# Patient Record
Sex: FEMALE | Race: BLACK OR AFRICAN AMERICAN | NOT HISPANIC OR LATINO | Employment: FULL TIME | URBAN - METROPOLITAN AREA
[De-identification: names, ages, dates, MRNs, and addresses within clinical notes are randomized per-mention and may not be internally consistent; named-entity substitution may affect disease eponyms.]

---

## 2021-02-10 ENCOUNTER — HISTORICAL (OUTPATIENT)
Dept: RADIOLOGY | Facility: HOSPITAL | Age: 31
End: 2021-02-10

## 2021-02-10 ENCOUNTER — HOSPITAL ENCOUNTER (OUTPATIENT)
Dept: MEDSURG UNIT | Facility: HOSPITAL | Age: 31
End: 2021-02-11

## 2021-02-10 LAB — SARS-COV-2 AG RESP QL IA.RAPID: NEGATIVE

## 2021-02-11 LAB — GRAM STN SPEC: NORMAL

## 2021-02-15 LAB — FINAL CULTURE: NO GROWTH

## 2021-03-15 LAB — FINAL CULTURE: NORMAL

## 2021-06-17 ENCOUNTER — HISTORICAL (OUTPATIENT)
Dept: ADMINISTRATIVE | Facility: HOSPITAL | Age: 31
End: 2021-06-17

## 2021-06-17 LAB
ABS NEUT (OLG): 3.75 X10(3)/MCL (ref 2.1–9.2)
ALBUMIN SERPL-MCNC: 3.9 GM/DL (ref 3.5–5)
ALBUMIN/GLOB SERPL: 1.1 RATIO (ref 1.1–2)
ALP SERPL-CCNC: 70 UNIT/L (ref 40–150)
ALT SERPL-CCNC: 11 UNIT/L (ref 0–55)
APPEARANCE, UA: NORMAL
AST SERPL-CCNC: 18 UNIT/L (ref 5–34)
BACTERIA #/AREA URNS AUTO: ABNORMAL /HPF
BASOPHILS # BLD AUTO: 0 X10(3)/MCL (ref 0–0.2)
BASOPHILS NFR BLD AUTO: 1 %
BILIRUB SERPL-MCNC: 0.3 MG/DL
BILIRUB UR QL STRIP: NEGATIVE
BILIRUBIN DIRECT+TOT PNL SERPL-MCNC: 0.1 MG/DL (ref 0–0.5)
BILIRUBIN DIRECT+TOT PNL SERPL-MCNC: 0.2 MG/DL (ref 0–0.8)
BUN SERPL-MCNC: 8.6 MG/DL (ref 7–18.7)
CALCIUM SERPL-MCNC: 9.1 MG/DL (ref 8.4–10.2)
CHLORIDE SERPL-SCNC: 107 MMOL/L (ref 98–107)
CHOLEST SERPL-MCNC: 162 MG/DL
CHOLEST/HDLC SERPL: 5 {RATIO} (ref 0–5)
CO2 SERPL-SCNC: 25 MMOL/L (ref 22–29)
COLOR UR: NORMAL
CREAT SERPL-MCNC: 0.67 MG/DL (ref 0.55–1.02)
EOSINOPHIL # BLD AUTO: 0.3 X10(3)/MCL (ref 0–0.9)
EOSINOPHIL NFR BLD AUTO: 4 %
ERYTHROCYTE [DISTWIDTH] IN BLOOD BY AUTOMATED COUNT: 15.3 % (ref 11.5–14.5)
EST. AVERAGE GLUCOSE BLD GHB EST-MCNC: 99.7 MG/DL
GLOBULIN SER-MCNC: 3.5 GM/DL (ref 2.4–3.5)
GLUCOSE (UA): NEGATIVE
GLUCOSE SERPL-MCNC: 80 MG/DL (ref 74–100)
HBA1C MFR BLD: 5.1 %
HCT VFR BLD AUTO: 40.3 % (ref 35–46)
HDLC SERPL-MCNC: 33 MG/DL (ref 35–60)
HGB BLD-MCNC: 12.1 GM/DL (ref 12–16)
HGB UR QL STRIP: NEGATIVE
HYALINE CASTS #/AREA URNS LPF: ABNORMAL /LPF
IMM GRANULOCYTES # BLD AUTO: 0.02 10*3/UL
IMM GRANULOCYTES NFR BLD AUTO: 0 %
KETONES UR QL STRIP: NEGATIVE
LDLC SERPL CALC-MCNC: 105 MG/DL (ref 50–140)
LEUKOCYTE ESTERASE UR QL STRIP: NEGATIVE
LYMPHOCYTES # BLD AUTO: 2.8 X10(3)/MCL (ref 0.6–4.6)
LYMPHOCYTES NFR BLD AUTO: 38 %
MCH RBC QN AUTO: 22.2 PG (ref 26–34)
MCHC RBC AUTO-ENTMCNC: 30 GM/DL (ref 31–37)
MCV RBC AUTO: 73.8 FL (ref 80–100)
MONOCYTES # BLD AUTO: 0.4 X10(3)/MCL (ref 0.1–1.3)
MONOCYTES NFR BLD AUTO: 6 %
NEUTROPHILS # BLD AUTO: 3.75 X10(3)/MCL (ref 2.1–9.2)
NEUTROPHILS NFR BLD AUTO: 51 %
NITRITE UR QL STRIP: NEGATIVE
NRBC BLD AUTO-RTO: 0 % (ref 0–0.2)
PH UR STRIP: 6 [PH] (ref 4.5–8)
PLATELET # BLD AUTO: 308 X10(3)/MCL (ref 130–400)
PMV BLD AUTO: 10.5 FL (ref 7.4–10.4)
POTASSIUM SERPL-SCNC: 3.7 MMOL/L (ref 3.5–5.1)
PROT SERPL-MCNC: 7.4 GM/DL (ref 6.4–8.3)
PROT UR QL STRIP: NEGATIVE
RBC # BLD AUTO: 5.46 X10(6)/MCL (ref 4–5.2)
RBC #/AREA URNS AUTO: ABNORMAL /HPF
SODIUM SERPL-SCNC: 139 MMOL/L (ref 136–145)
SP GR UR STRIP: 1.02 (ref 1–1.03)
SQUAMOUS #/AREA URNS LPF: >100 /LPF
TRIGL SERPL-MCNC: 119 MG/DL (ref 37–140)
TSH SERPL-ACNC: 1.4 UIU/ML (ref 0.35–4.94)
UROBILINOGEN UR STRIP-ACNC: NORMAL
VLDLC SERPL CALC-MCNC: 24 MG/DL
WBC # SPEC AUTO: 7.3 X10(3)/MCL (ref 4.5–11)
WBC #/AREA URNS AUTO: ABNORMAL /HPF

## 2021-06-19 LAB — FINAL CULTURE: NORMAL

## 2022-01-27 ENCOUNTER — HISTORICAL (OUTPATIENT)
Dept: SURGERY | Facility: HOSPITAL | Age: 32
End: 2022-01-27

## 2022-01-27 LAB — SARS-COV-2 AG RESP QL IA.RAPID: NEGATIVE

## 2022-04-09 ENCOUNTER — HISTORICAL (OUTPATIENT)
Dept: ADMINISTRATIVE | Facility: HOSPITAL | Age: 32
End: 2022-04-09

## 2022-04-26 VITALS
OXYGEN SATURATION: 98 % | DIASTOLIC BLOOD PRESSURE: 86 MMHG | WEIGHT: 277.31 LBS | HEIGHT: 66 IN | BODY MASS INDEX: 44.57 KG/M2 | SYSTOLIC BLOOD PRESSURE: 131 MMHG

## 2022-05-02 NOTE — HISTORICAL [facility]ER
This is a historical note converted from Eugenia. Formatting and pictures may have been removed.  Please reference Eugenia for original formatting and attached multimedia. Chief Complaint  Est care GYN c/o feeling tired  History of Present Illness  31 Years?Black or ?Female?presents to [facility] [place] to establish care?with PCP. ?PMH: None (per review of?available EMR?and confirmed with patient).  ?   Previous PCP: None  PmHx: None  SHx: D and C; abscess with Iand D b/l breast  FHx: DM2, HTN- Mother and Father; Colon Ca- Father ( age 63 from colon ca)  ?   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 Wine occasional  Tobacco Intake? 1PPX 10 yrs and now 1/2 PPD  Illicit drug use? None  Sexual history: heterosexual  Lives with? Lives with 8, 9, 10? daughters  Daily Exercise???Moderate  ?   Complaints today:  Fatigue: Pt states she is excessively tired. She denies anxiety or depression. She drinks four 20 ounces of coke a cola a day. Pt states she eats a lot of candies.?  Obesity: Discussed healthy eating habits and exercise 150 minutes/ day. Discussed with patient the benefits to healthy BMI 25 or less. Discussed the adverse effects of obesity on health causing risk for other co-morbid conditions such has HTN, CAD, CVD, HLD, and DM2.  Vertigo with bending head down  Right knee pain: with limited flexion; had torn ligaments at age 15; worse with squatting 10/10 and ambulating pain is 7/10; pain characterized as aching.  ?   Preventative:  Last? cervical pap was 7 yrs ago;? LMP: 2021  ?  Review of Systems  GENERAL:?Negative for abnormal weight loss or weight gain, fatigue, fever, or chills. Negative except for stated in HPI.  HEENT:??Negative for head trauma,?blurred vision, rhinorrhea, ?nasal congestion, sore throat or hearing deficit. Negative except for stated in  HPI.  CARDIOVASCULAR: Negative for? chest pain, palpitations, CUBA, PND, orthopnea, peripheral edema or syncope. Negative except for stated in HPI.  RESPIRATORY: Negative for SOB, CUBA, cough or?wheezing.? Negative except for stated in HPI.  GASTROINTESTINAL: Negative for abdominal pain, nausea,??vomiting, diarrhea, constipation, heartburn, ?hematochezia? or melena.? Negative except for stated in HPI.  GENITOURINARY: Negative for dysuria, hematuria, urinary frequency, urinary urgency, urinary hesitancy or flank pain. Negative except for stated in HPI.  ENDOCRINE: Negative for fatigue, heat intolerance, cold intolerance, polyuria, polydipsia, or polyphagia.? Negative except for stated in HPI.  PSYCHOLOGICAL: Negative for depression, anxiety, suicidal or homicidal ideations, hallucinations??or?conner.? Negative except for?stated in HPI.  MUSCULOSKELETAL:?? Negative except for?stated in HPI.  INTEGUMENT: Negative for rash?or lesions. Negative except for stated in HPI.  NEUROLOGY: Negative for headaches, dizziness, numbness, tingling,?vertigo?or gait disturbances. Negative except for stated in HPI.  ?  ?  Physical Exam  Vitals & Measurements  T:?36.7? ?C (Oral)? HR:?70(Peripheral)? RR:?20? BP:?131/86? SpO2:?98%?  HT:?168.00?cm? WT:?125.800?kg? BMI:?44.57? LMP:?06/10/2021 00:00 CDT?  GENERAL:?Alert and oriented to person, place, time and situation,?NAD  HEENT: NCAT, Pupils equally round and reactive to light accommodation,?normal sclera, ?moist mucous membranes,?oropharynx normal,?normal nasal turbinates/ nares,??TM clear bilaterally, neck?supple,?thyroid normal,?no lymphadenopathy.  CARDIOVASCULAR:?Regular rateand?rhythm,? S1 and S2 normal;?no murmurs, no rubs, or no gallops; no carotid bruit.  RESPIRATORY:??Lungs clear to auscultation bilaterally;?no wheezes,?no rhonchi,?or? no crackles  ABDOMEN: Soft/ Nontender/ Nondistended; Bowel Sounds x4 - normoactive; no abdominal pulsatile mass, no masses, no hernias  EXTREMITY:?  No clubbing, no cyanosis and?no peripheral edema; Dorsalis pedis and tibial ?pulses 2+  MUSCULOSKELETAL: FROM of Upper and LLE; Strength 5/5 of Upper and Lower extremities; LROM right knee on flexion with TTP lateral jt space and crepitus  PSYCHOLOGICAL: Good judgement and insight; normal affect and mood.  NEUROLOGICAL: Normal gait and ?normal sensation;?no focal deficits; Cranial Nerves 2 -12 grossly intact  ?  Assessment/Plan  1.?Well adult exam?Z00.00  Preventative guidance discussed based on age :?seat belt use; avoidance of cell phone use while driving; avoidance of? tobacco use, ETOH, illicit drug or prescription narcotic medications not prescribed; safer sex practices, suicide prevention, screening for cervical pap?q3 yrs unless abnormal pap with HPV (then yearly), and MMG at age 40.  Labs ordered see below  Ordered:  CBC w/ Auto Diff, Routine collect, *Est. 06/17/21 3:00:00 CDT, Blood, Order for future visit, *Est. Stop date 06/17/21 3:00:00 CDT, Lab Collect, Well adult exam, 06/17/21 10:58:00 CDT  Clinic Follow up, *Est. 07/29/21 3:00:00 CDT, Order for future visit, Well adult exam  Right knee pain  Morbid obesity  Low back pain, [facility] Service  Comprehensive Metabolic Panel, Routine collect, *Est. 06/17/21 3:00:00 CDT, Blood, Order for future visit, *Est. Stop date 06/17/21 3:00:00 CDT, Lab Collect, Well adult exam, 06/17/21 10:58:00 CDT  Hemoglobin A1C [facility], Routine collect, *Est. 06/17/21 3:00:00 CDT, Blood, Order for future visit, *Est. Stop date 06/17/21 3:00:00 CDT, Lab Collect, Well adult exam, 06/17/21 10:58:00 CDT  Lipid Panel, Routine collect, *Est. 06/17/21 3:00:00 CDT, Blood, Order for future visit, *Est. Stop date 06/17/21 3:00:00 CDT, Lab Collect, Well adult exam, 06/17/21 10:58:00 CDT  Office/Outpatient Visit Level 4 New [id number] PC, Right knee pain  Low back pain  Well adult exam  Morbid obesity  Vertigo, 06/17/21 10:59:00 CDT  Thyroid Stimulating Hormone, Routine collect, *Est.  06/17/21 3:00:00 CDT, Blood, Order for future visit, *Est. Stop date 06/17/21 3:00:00 CDT, Lab Collect, Well adult exam, 06/17/21 10:58:00 CDT  Urinalysis with Microscopic if Indicated, Routine collect, Urine, Order for future visit, *Est. 06/17/21 3:00:00 CDT, *Est. Stop date 06/17/21 3:00:00 CDT, Nurse collect, Well adult exam  ?  2.?Morbid obesity?E66.01  ?Chronic issue  Discussed healthy eating habits and healthy BMI of 25 . Avoid fried fatty foods and fast foods which are high in saturated fats.  Encouraged eating 3 -4 servings daily of fruits and vegetables and eat?lean meats.  Decrease carbohydrates made with?refined sugars (cakes, cookies, pasta, bread,etc).  Drink plenty of water at least 64 ounce/day.  Encourage exercise for 40 minutes 3-4 times/ week (total of 150 minutes of exercise a week).  Ordered:  Clinic Follow up, *Est. 07/29/21 3:00:00 CDT, Order for future visit, Well adult exam  Right knee pain  Morbid obesity  Low back pain, [facility] Service  Office/Outpatient Visit Level 4 New [id number] PC, Right knee pain  Low back pain  Well adult exam  Morbid obesity  Vertigo, 06/17/21 10:59:00 CDT  ?  3.?Right knee pain?M25.561  Chronic issue/exacerbated  Start ibuprofen 800 mg po BID for?food and plenty of water  Ketoralac 30 mg IM x1  XR Knee right ordered  Ordered:  ibuprofen, 800 mg = 1 tab(s), Oral, BID, with food and plenty of water, X 30 day(s), # 60 tab(s), 3 Refill(s), Pharmacy: [facility]/pharmacy #[id number], 168, cm, Height/Length Dosing, 06/17/21 9:33:00 CDT, 125.8, kg, Weight Dosing, 06/17/21 9:33:00 CDT  ketorolac, 30 mg, form: Injection, IM, Once-Unscheduled, Order duration: 5 day(s), first dose 06/17/21 10:56:00 CDT, stop date 06/22/21 10:55:00 CDT  Clinic Follow up, *Est. 07/29/21 3:00:00 CDT, Order for future visit, Well adult exam  Right knee pain  Morbid obesity  Low back pain, [facility] Service  Office/Outpatient Visit Level 4 New [id number] PC, Right knee pain  Low back pain  Well  adult exam  Morbid obesity  Vertigo, 06/17/21 10:59:00 CDT  XR Knee Right 3 Views, Routine, 06/17/21 10:57:00 CDT, None, Ambulatory, Rad Type, Order for future visit, Right knee pain, Not Scheduled, 06/17/21 10:57:00 CDT  ?  4.?Low back pain?M54.5  ?Issue/exacerbated  Start ibuprofen 800 mg po BID for?food and plenty of water  Ketoralac 30 mg IM x1  Ordered:  ibuprofen, 800 mg = 1 tab(s), Oral, BID, with food and plenty of water, X 30 day(s), # 60 tab(s), 3 Refill(s), Pharmacy: [facility]/pharmacy #[id number], 168, cm, Height/Length Dosing, 06/17/21 9:33:00 CDT, 125.8, kg, Weight Dosing, 06/17/21 9:33:00 CDT  ketorolac, 30 mg, form: Injection, IM, Once-Unscheduled, Order duration: 5 day(s), first dose 06/17/21 10:56:00 CDT, stop date 06/22/21 10:55:00 CDT  Clinic Follow up, *Est. 07/29/21 3:00:00 CDT, Order for future visit, Well adult exam  Right knee pain  Morbid obesity  Low back pain, [facility] Service  Office/Outpatient Visit Level 4 New [id number] PC, Right knee pain  Low back pain  Well adult exam  Morbid obesity  Vertigo, 06/17/21 10:59:00 CDT  ?  5.?Vertigo?R42  ?Chronic issue. ?Suspect?BPPV.??Epley maneuver demonstrated the patient.? Handout given.  Ordered:  Office/Outpatient Visit Level 4 New [id number] PC, Right knee pain  Low back pain  Well adult exam  Morbid obesity  Vertigo, 06/17/21 10:59:00 CDT  ?  RTC in 6 wks chronic issue as above  Referrals  Clinic Follow up, *Est. 07/29/21 3:00:00 CDT, Order for future visit, Well adult exam  Right knee pain  Morbid obesity  Low back pain, [facility] Service   Problem List/Past Medical History  Ongoing  Abdominal pain in female  Abscess of breast  Knee pain  Morbid obesity  Tobacco user  Historical  No qualifying data  Procedure/Surgical History  Drainage of Bilateral Breast, Open Approach (02/10/2021)  Incision & Drainage Breast (Bilateral) (02/10/2021)  Incision and drainage of abscess (eg, carbuncle, suppurative hidradenitis, cutaneous or subcutaneous  abscess, cyst, furuncle, or paronychia); simple or single (02/10/2021)  Drainage of Left Breast, Open Approach (02/05/2021)  Incision and drainage of abscess (eg, carbuncle, suppurative hidradenitis, cutaneous or subcutaneous abscess, cyst, furuncle, or paronychia); complicated or multiple (02/05/2021)  Drainage of Left Breast, Open Approach (11/27/2020)  Incision and drainage of abscess (eg, carbuncle, suppurative hidradenitis, cutaneous or subcutaneous abscess, cyst, furuncle, or paronychia); complicated or multiple (11/27/2020)  Ultrasound, breast(s) (unilateral or bilateral), B-scan and/or real time with image documentation (06/13/2017)  Ultrasound, breast(s) (unilateral or bilateral), B-scan and/or real time with image documentation (10/03/2016)  US OB 2ND OR 3RD TRIMESTER COMPLETE FETAL MATERNAL EVAL (05/02/2013)  Breast incision  Dilation and curettage, diagnostic and/or therapeutic (nonobstetrical)   Medications  cloniDINE 0.1 mg oral tablet, 0.2 mg= 2 tab(s), Oral, Once  epinephrine-lidocaine 1:100,000-1% injectable solution, 5 mL, Intralesional, Once-NOW  ibuprofen 800 mg oral tablet, 800 mg= 1 tab(s), Oral, BID, 3 refills  ketorolac 30 mg/mL injectable solution, 30 mg= 1 mL, IM, Once-Unscheduled  Ofirmev, 1000 mg= 100 mL, IV Piggyback, Once  Allergies  No Known Allergies  Social History  Abuse/Neglect  No, No, Yes, 06/17/2021  Alcohol - Denies Alcohol Use, 01/13/2014  Current, Wine, Alcohol use interferes with work or home: No. Others hurt by drinking: No. Household alcohol concerns: No., 06/17/2021  Employment/School  Employed, 02/09/2021  Exercise  Exercise frequency: Daily., 02/09/2021  Financial/Legal Situation  None, 02/09/2021  Home/Environment  Lives with Children, Significant other., 02/09/2021    Never in , 02/09/2021  Nutrition/Health  Regular, 02/09/2021  Sexual  Sexually active: Yes., 02/09/2021  Spiritual/Cultural  Sikhism, 02/09/2021  Substance Use - Denies Substance Abuse,  01/13/2014  Never, 03/25/2021  Never, 02/25/2021  Never, 09/19/2018  Tobacco - Medium Risk, 10/03/2014  4 or less cigarettes(less than 1/4 pack)/day in last 30 days, Yes, 06/17/2021  Family History  Diabetes mellitus type 1.: Mother and Father.  Hypertension.: Mother.  Hypothyroidism.: Mother and Father.  Primary malignant neoplasm of colon: Father.  Immunizations  Vaccine Date Status   tetanus/diphtheria/pertussis, acel(Tdap) 09/23/2013 Recorded   pneumococcal 7-valent vaccine 09/23/2013 Recorded   measles/mumps/rubella virus vaccine 02/12/1997 Recorded   measles/mumps/rubella virus vaccine 11/06/1995 Recorded   Health Maintenance  Health Maintenance  ???Pending?(in the next year)  ??? ??OverDue  ??? ? ? ?Influenza Vaccine due??10/01/20??and every 1??day(s)  ??? ? ? ?Smoking Cessation due??01/01/21??and every 1??year(s)  ??? ??Due?  ??? ? ? ?Cervical Cancer Screening due??06/17/21??Unknown Frequency  ??? ??Due In Future?  ??? ? ? ?Obesity Screening not due until??01/01/22??and every 1??year(s)  ??? ? ? ?Alcohol Misuse Screening not due until??01/02/22??and every 1??year(s)  ???Satisfied?(in the past 1 year)  ??? ??Satisfied?  ??? ? ? ?ADL Screening on??06/17/21.??Satisfied by Milo Barba LPN  ??? ? ? ?Alcohol Misuse Screening on??06/17/21.??Satisfied by Milo Barba LPN  ??? ? ? ?Blood Pressure Screening on??06/17/21.??Satisfied by Milo Barba LPN  ??? ? ? ?Body Mass Index Check on??06/17/21.??Satisfied by Milo Barba LPN  ??? ? ? ?Depression Screening on??06/17/21.??Satisfied by Milo Barba LPN  ??? ? ? ?Diabetes Screening on??02/05/21.??Satisfied by Evelina Jones  ??? ? ? ?Influenza Vaccine on??03/25/21.??Satisfied by Shelley Fonseca LPN  ??? ? ? ?Obesity Screening on??06/17/21.??Satisfied by Milo Barba LPN  ?

## 2022-05-14 NOTE — OP NOTE
DATE OF SERVICE:?01/27/22  ?  SURGEON: Dr. Shraddha Mike  ?   ASSIST:?None  ?   PREOPERATIVE DIAGNOSIS:?Abscess of the breast and nipple  ?  POSTOPERATIVE DIAGNOSIS:?Abscess of the breast and nipple  ?  PROCEDURE:  1.?Left breast mastotomy and drainage  ?  ANESTHESIA:?General LMA  Jayme BOYD, Sridhar BARAHONA (Supervisor)  Elba BOYD, Ernie LANIER (Supervisor)  Karina Banks CRNA (Provider)  ?  ESTIMATED BLOOD LOSS:?10 mL  ?  FINDINGS: Left breast abscess  ?   SPECIMEN:?None  ?   DRAIN(S): 1/4 in Penrose Drain  ?  COMPLICATIONS: None  ?  PROCEDURE INDICATION:  Taylor Crain presents today for left breast abscess. She began to develop pain in the left breast about 7 days prior. On day 4 she was seen and [facility] ED on 1/25/2022 at which time they noted concern for an abscess. She was prescribed antibiotics but was not able to pick them up yet. She then presented to the [facility] ED today with worsening pain and redness of the left breast. US Chest was performed revealing a 8.1 cm x 1.7 cm x 2.6 cm fluid collection beneath the 6 oclock left NAC.  ?  She was sent from the ED to be evaluated. She has past history of recurrent breast abscess. She has previously had I&Ds of the right and left breast. Most recently she had an I&D 2/13/2021 at which time both breast were drained. She had not had any issues since this time.  ?  Risk and benefits were explained to her in detail. Also explained the need to leave a drain catheter, usually a Penrose as well as the occasional need to leave packing. Informed written consent was obtained prior to surgery,  ?  PROCEDURE DESCRIPTION:  The patient was then brought to the operating room and placed supine on the operative table. General anesthesia was induced.?The skin of?the Left?breast was prepped and draped in standard sterile surgical fashion along with?the Left?axilla and upper arm. A time-out was completed verifying correct patient, procedure, site, positioning and equipment prior to beginning  the procedure.?  ?  A circumareolar incision was made using a 15-blade from 5-7 oclock in the left breast breast. Purulent material was immediately expressed from the breast. Cultures were not sent during?the procedure secondary to them being?sent from the office previously.?The cavity was inspected for loculations and cavities. All loculations and cavities were opened. The cavity tracked medially and and laterally. The cavity was copiously irrigated with antibiotic solution for about 1 liter. Hemostasis was checked. The erythema had completely improved. A 1/4-in Penrose drainage was placed into the cavity. The incision was closed with 3-0 Prolene vertical mattress and simple interrupted sutures. The Penrose drain was sutured in place with a vertical mattress stitch.  ?  Sterile dressing was applied over the area.  ?  All instruments and sponge counts were correct at the end of the procedure. The patient was awaken from anesthesia and taken to the post-anesthesia care unit in stable condition.

## 2022-10-11 ENCOUNTER — HOSPITAL ENCOUNTER (EMERGENCY)
Facility: HOSPITAL | Age: 32
Discharge: HOME | End: 2022-10-11
Attending: STUDENT IN AN ORGANIZED HEALTH CARE EDUCATION/TRAINING PROGRAM
Payer: MEDICAID

## 2022-10-11 VITALS
OXYGEN SATURATION: 100 % | SYSTOLIC BLOOD PRESSURE: 152 MMHG | DIASTOLIC BLOOD PRESSURE: 95 MMHG | BODY MASS INDEX: 48.82 KG/M2 | RESPIRATION RATE: 20 BRPM | HEART RATE: 95 BPM | HEIGHT: 65 IN | TEMPERATURE: 97 F | WEIGHT: 293 LBS

## 2022-10-11 DIAGNOSIS — N64.4 BREAST PAIN, LEFT: Primary | ICD-10-CM

## 2022-10-11 PROCEDURE — 99284 EMERGENCY DEPT VISIT MOD MDM: CPT

## 2022-10-11 RX ORDER — HYDROCODONE BITARTRATE AND ACETAMINOPHEN 5; 325 MG/1; MG/1
1 TABLET ORAL EVERY 8 HOURS PRN
Qty: 12 TABLET | Refills: 0 | Status: SHIPPED | OUTPATIENT
Start: 2022-10-11 | End: 2022-10-16

## 2022-10-11 RX ORDER — FLUCONAZOLE 200 MG/1
200 TABLET ORAL DAILY
Qty: 1 TABLET | Refills: 0 | Status: SHIPPED | OUTPATIENT
Start: 2022-10-11 | End: 2022-10-12

## 2022-10-11 RX ORDER — SULFAMETHOXAZOLE AND TRIMETHOPRIM 800; 160 MG/1; MG/1
1 TABLET ORAL 2 TIMES DAILY
Qty: 14 TABLET | Refills: 0 | Status: SHIPPED | OUTPATIENT
Start: 2022-10-11 | End: 2022-10-18

## 2022-10-11 NOTE — Clinical Note
"Taylor Watson" Paras was seen and treated in our emergency department on 10/11/2022.  She may return to work on 10/13/2022.       If you have any questions or concerns, please don't hesitate to call.       RN    "

## 2022-10-14 ENCOUNTER — HOSPITAL ENCOUNTER (EMERGENCY)
Facility: HOSPITAL | Age: 32
Discharge: HOME | End: 2022-10-14
Attending: INTERNAL MEDICINE
Payer: MEDICAID

## 2022-10-14 VITALS
OXYGEN SATURATION: 100 % | HEART RATE: 85 BPM | SYSTOLIC BLOOD PRESSURE: 106 MMHG | TEMPERATURE: 99 F | RESPIRATION RATE: 18 BRPM | BODY MASS INDEX: 51.73 KG/M2 | DIASTOLIC BLOOD PRESSURE: 78 MMHG | WEIGHT: 293 LBS

## 2022-10-14 DIAGNOSIS — R05.9 COUGH, UNSPECIFIED TYPE: Primary | ICD-10-CM

## 2022-10-14 DIAGNOSIS — N61.0 CELLULITIS OF LEFT BREAST: ICD-10-CM

## 2022-10-14 PROCEDURE — 99281 EMR DPT VST MAYX REQ PHY/QHP: CPT

## 2022-10-14 NOTE — ED PROVIDER NOTES
Encounter Date: 10/14/2022       History     Chief Complaint   Patient presents with    Breast Pain     Pt reports pain to left breast seen here two days ago for left breast abscess, pt on antibiotics, pt reports drainage. No fever.      Patient presents today c/o dry cough for 2 days. Her 3 daughters have similar symptoms. She also reports very small amount of drainage from wound on left breast. She was recently see in ED for this and dx with an breast abscess. She reports similar episodes in the past. She is currently taking bactrim and norco. Denies fever, chills.     The history is provided by the patient. No  was used.   Review of patient's allergies indicates:  No Known Allergies  No past medical history on file.  No past surgical history on file.  No family history on file.     Review of Systems   Constitutional:  Negative for chills and fever.   HENT:  Negative for congestion, postnasal drip, rhinorrhea, sinus pressure, sinus pain and sore throat.    Respiratory:  Positive for cough. Negative for chest tightness, shortness of breath, wheezing and stridor.    Cardiovascular:  Negative for chest pain, palpitations and leg swelling.   Gastrointestinal:  Negative for abdominal pain, constipation, diarrhea, nausea and vomiting.   Genitourinary:  Negative for dysuria, flank pain and hematuria.   Musculoskeletal:  Negative for arthralgias and myalgias.   Skin:  Positive for wound.   Neurological:  Negative for light-headedness and headaches.     Physical Exam     Initial Vitals [10/14/22 1352]   BP Pulse Resp Temp SpO2   106/78 85 18 98.5 °F (36.9 °C) 100 %      MAP       --         Physical Exam    Vitals reviewed.  Constitutional: She appears well-developed and well-nourished. She is not diaphoretic. No distress.   Obese    HENT:   Head: Normocephalic and atraumatic.   Mouth/Throat: Oropharynx is clear and moist. No oropharyngeal exudate.   Eyes: Conjunctivae and EOM are normal.   Neck: Neck  supple.   Normal range of motion.  Cardiovascular:  Normal rate, regular rhythm, normal heart sounds and intact distal pulses.           Pulmonary/Chest: Breath sounds normal. No respiratory distress.   Abdominal: Abdomen is soft. Bowel sounds are normal. She exhibits no distension. There is no abdominal tenderness. There is no rebound and no guarding.   Musculoskeletal:         General: No edema.      Cervical back: Normal range of motion and neck supple.     Neurological: She is alert and oriented to person, place, and time. GCS score is 15. GCS eye subscore is 4. GCS verbal subscore is 5. GCS motor subscore is 6.   Skin: Skin is warm and dry. Capillary refill takes less than 2 seconds.   Left areola with ~[id number] area of tenderness near the 9 o'clock position. This area is somewhat firm to palpation, but there is no induration, fluctuance, or active drainage. Overall, not drainable fluid collection. No palpable lumps or masses.    Psychiatric: She has a normal mood and affect.       ED Course   Procedures  Labs Reviewed - No data to display       Imaging Results    None          Medications - No data to display                           Clinical Impression:   Final diagnoses:  [R05.9] Cough, unspecified type (Primary)  [N61.0] Cellulitis of left breast      ED Disposition Condition    Discharge Stable          ED Prescriptions    None       Follow-up Information       Follow up With Specialties Details Why Contact Info    [facility] - Emergency Dept Emergency Medicine  If symptoms worsen return to ED immediately [address] [place] [address] [phone]    Primary Care Provider within 1-2 days  Go in 1 day               TRINA Felder  10/14/22 1034

## 2022-10-14 NOTE — Clinical Note
"Taylor Watson" Paras was seen and treated in our emergency department on 10/14/2022.  She may return to work on 10/15/2022.       If you have any questions or concerns, please don't hesitate to call.       LPN    "

## 2022-11-14 NOTE — ED PROVIDER NOTES
Encounter Date: 10/11/2022       History     Chief Complaint   Patient presents with    Breast Pain     Left breast pain x2 days     Leg Swelling     BLE edema x1 week, per pt.      Patient reports left breast pain for the past x2 days; patient also reports bilateral lower extremity pain for the past x1 week; pt denies nausea/vomiting/fever    The history is provided by the patient.   Illness   The current episode started two days ago. The problem occurs rarely. The pain is at a severity of 3/10. Pertinent negatives include no fever, no decreased vision, no eye itching, no abdominal pain, no nausea, no vomiting, no congestion, no headaches, no sore throat, no swollen glands, no neck pain, no shortness of breath, no rash and no pain.   Review of patient's allergies indicates:  No Known Allergies  No past medical history on file.  No past surgical history on file.  No family history on file.     Review of Systems   Constitutional:  Negative for fever.   HENT:  Negative for congestion and sore throat.    Eyes: Negative.  Negative for pain and itching.   Respiratory:  Negative for shortness of breath.    Cardiovascular:  Negative for chest pain.   Gastrointestinal:  Negative for abdominal pain, nausea and vomiting.   Genitourinary:  Negative for dysuria.   Musculoskeletal:  Negative for back pain and neck pain.   Skin:  Negative for rash.   Neurological:  Negative for weakness and headaches.   Hematological:  Does not bruise/bleed easily.   Psychiatric/Behavioral: Negative.       Physical Exam     Initial Vitals [10/11/22 2118]   BP Pulse Resp Temp SpO2   (!) 152/95 95 20 97.2 °F (36.2 °C) 100 %      MAP       --         Physical Exam    Vitals reviewed.  Constitutional: She appears well-developed and well-nourished.   HENT:   Head: Normocephalic and atraumatic.   Eyes: Conjunctivae and EOM are normal. Pupils are equal, round, and reactive to light.   Neck: Neck supple.   Normal range of motion.  Cardiovascular:  Normal  rate, regular rhythm and normal heart sounds.           Pulmonary/Chest: Breath sounds normal. No respiratory distress. She has no wheezes. She exhibits no tenderness.   Patient declined a breast exam at this time   Abdominal: Abdomen is soft. Bowel sounds are normal. There is no abdominal tenderness.   Musculoskeletal:         General: Normal range of motion.      Cervical back: Normal range of motion and neck supple.      Right lower leg: No deformity, lacerations or tenderness. 1+ Edema present.      Left lower leg: No deformity, lacerations or tenderness. 1+ Edema present.      Right ankle: Normal pulse.      Left ankle: Normal pulse.     Neurological: She is alert and oriented to person, place, and time. She displays normal reflexes. No cranial nerve deficit or sensory deficit.   Skin: Skin is warm and dry.   Psychiatric: She has a normal mood and affect. Her behavior is normal. Judgment and thought content normal.       ED Course   Procedures  Labs Reviewed - No data to display       Imaging Results    None          Medications - No data to display        APC / Resident Notes:   I was not physically present during the history, exam or disposition of this patient. I was available at all times for consultation. ([facility])      ED Course as of 22   Tue Oct 11, 2022   2118 Patient declined to have any labs / diagnostics at this time and requested to be discharged with only medication at this time; ER precautions provided [AL]      ED Course User Index  [AL] TRINA Jj                 Clinical Impression:   Final diagnoses:  [N64.4] Breast pain, left (Primary)        ED Disposition Condition    Discharge Stable          ED Prescriptions       Medication Sig Dispense Start Date End Date Auth. Provider    sulfamethoxazole-trimethoprim 800-160mg (BACTRIM DS) 800-160 mg Tab () Take 1 tablet by mouth 2 (two) times daily. for 7 days 14 tablet 10/11/2022 10/18/2022 TRINA Jj     HYDROcodone-acetaminophen (NORCO) 5-325 mg per tablet () Take 1 tablet by mouth every 8 (eight) hours as needed for Pain. 12 tablet 10/11/2022 10/16/2022 TRINA Jj    fluconazole (DIFLUCAN) 200 MG Tab () Take 1 tablet (200 mg total) by mouth once daily. for 1 day 1 tablet 10/11/2022 10/12/2022 TRINA Jj          Follow-up Information       Follow up With Specialties Details Why Contact Info    discharge followup    If your symptoms become WORSE or you DO NOT IMPROVE and you are unable to reach your health care provider, you should RETURN to the emergency department    discharge info    Discussed all pertinent ED information, results, diagnosis and treatment plan; All questions and concerns were addressed at this time. Patient voices understanding of information and instructions. Patient is comfortable with plan and discharge             TRINA Jj  [id number]       Alexy Hidalgo MD  [id number]

## 2022-11-29 ENCOUNTER — HOSPITAL ENCOUNTER (EMERGENCY)
Facility: HOSPITAL | Age: 32
Discharge: HOME | End: 2022-11-29
Attending: STUDENT IN AN ORGANIZED HEALTH CARE EDUCATION/TRAINING PROGRAM
Payer: MEDICAID

## 2022-11-29 VITALS
HEIGHT: 64 IN | OXYGEN SATURATION: 99 % | BODY MASS INDEX: 47.8 KG/M2 | SYSTOLIC BLOOD PRESSURE: 162 MMHG | HEART RATE: 85 BPM | DIASTOLIC BLOOD PRESSURE: 97 MMHG | WEIGHT: 280 LBS | TEMPERATURE: 99 F | RESPIRATION RATE: 18 BRPM

## 2022-11-29 DIAGNOSIS — N61.1 BREAST ABSCESS: Primary | ICD-10-CM

## 2022-11-29 LAB
APPEARANCE UR: CLEAR
B-HCG SERPL QL: NEGATIVE
BILIRUB UR QL STRIP.AUTO: NEGATIVE MG/DL
COLOR UR AUTO: YELLOW
GLUCOSE UR QL STRIP.AUTO: NEGATIVE MG/DL
KETONES UR QL STRIP.AUTO: NEGATIVE MG/DL
LEUKOCYTE ESTERASE UR QL STRIP.AUTO: NEGATIVE UNIT/L
NITRITE UR QL STRIP.AUTO: NEGATIVE
PH UR STRIP.AUTO: 6.5 [PH]
PROT UR QL STRIP.AUTO: NEGATIVE MG/DL
RBC UR QL AUTO: NEGATIVE UNIT/L
SP GR UR STRIP.AUTO: >=1.03
UROBILINOGEN UR STRIP-ACNC: 0.2 MG/DL

## 2022-11-29 PROCEDURE — 63600175 PHARM REV CODE 636 W HCPCS: Performed by: PHYSICIAN ASSISTANT

## 2022-11-29 PROCEDURE — 81003 URINALYSIS AUTO W/O SCOPE: CPT | Performed by: PHYSICIAN ASSISTANT

## 2022-11-29 PROCEDURE — 99284 EMERGENCY DEPT VISIT MOD MDM: CPT

## 2022-11-29 PROCEDURE — 81025 URINE PREGNANCY TEST: CPT | Performed by: PHYSICIAN ASSISTANT

## 2022-11-29 PROCEDURE — 96372 THER/PROPH/DIAG INJ SC/IM: CPT | Performed by: PHYSICIAN ASSISTANT

## 2022-11-29 RX ORDER — KETOROLAC TROMETHAMINE 10 MG/1
10 TABLET, FILM COATED ORAL EVERY 6 HOURS PRN
Qty: 20 TABLET | Refills: 0 | Status: SHIPPED | OUTPATIENT
Start: 2022-11-29 | End: 2022-12-04

## 2022-11-29 RX ORDER — KETOROLAC TROMETHAMINE 30 MG/ML
60 INJECTION, SOLUTION INTRAMUSCULAR; INTRAVENOUS
Status: COMPLETED | OUTPATIENT
Start: 2022-11-29 | End: 2022-11-29

## 2022-11-29 RX ORDER — HYDROCODONE BITARTRATE AND ACETAMINOPHEN 5; 325 MG/1; MG/1
1 TABLET ORAL EVERY 6 HOURS PRN
Qty: 20 TABLET | Refills: 0 | Status: SHIPPED | OUTPATIENT
Start: 2022-11-29 | End: 2022-12-04

## 2022-11-29 RX ORDER — SULFAMETHOXAZOLE AND TRIMETHOPRIM 800; 160 MG/1; MG/1
1 TABLET ORAL 2 TIMES DAILY
Qty: 14 TABLET | Refills: 0 | Status: SHIPPED | OUTPATIENT
Start: 2022-11-29 | End: 2022-12-06

## 2022-11-29 RX ADMIN — KETOROLAC TROMETHAMINE 60 MG: 30 INJECTION, SOLUTION INTRAMUSCULAR at 08:11

## 2022-11-29 NOTE — Clinical Note
"Taylor Bunnviviana Crain was seen and treated in our emergency department on 11/29/2022.  She may return to work on 12/02/2022.       If you have any questions or concerns, please don't hesitate to call.      Igor Maynard MD"

## 2022-11-30 NOTE — DISCHARGE INSTRUCTIONS
Take full course of antibiotics.  Use Toradol for pain and swelling.  May use Norco for severe pain.  Do not drink or drive while taking narcotics as can be sedating.  Follow-up with breast surgeon for further evaluation.

## 2022-11-30 NOTE — ED PROVIDER NOTES
Encounter Date: 11/29/2022       History     Chief Complaint   Patient presents with    Breast Pain     Bilateral breast (nipple) tenderness x1 week; reports lactating a reddish/yellowish discharge      30-year-old female presents to ED for evaluation of bilateral breast pain worsening over the last week.  Patient reports that she has intermittent discharge from both the nipple and sore on her left breast.  Reports to a reddish drainage.  Patient reports she has had breast issues over the last 6 years and has had multiple abscesses in bilateral breasts.  Is not currently following with any gyn or breast surgeon.  Denies any fever.  States painful to touch or wear a bra.    The history is provided by the patient. No  was used.   Review of patient's allergies indicates:  No Known Allergies  History reviewed. No pertinent past medical history.  No past surgical history on file.  History reviewed. No pertinent family history.  Social History     Tobacco Use    Smoking status: Some Days     Types: Cigarettes    Smokeless tobacco: Never   Substance Use Topics    Alcohol use: Never    Drug use: Never     Review of Systems   Constitutional:  Negative for fever.   HENT:  Negative for sore throat.    Respiratory:  Negative for shortness of breath.    Cardiovascular:  Negative for chest pain.   Gastrointestinal:  Negative for nausea.   Genitourinary:  Negative for decreased urine volume, dysuria, flank pain, menstrual problem, pelvic pain, urgency, vaginal bleeding, vaginal discharge and vaginal pain.   Musculoskeletal:  Negative for back pain.   Skin:  Negative for rash.        Bilateral breast pain and abscess   Neurological:  Negative for weakness.   Hematological:  Does not bruise/bleed easily.   All other systems reviewed and are negative.    Physical Exam     Initial Vitals [11/29/22 1901]   BP Pulse Resp Temp SpO2   (!) 162/97 85 18 98.5 °F (36.9 °C) 99 %      MAP       --         Physical  Exam    Vitals reviewed.  Constitutional: She appears well-developed.   HENT:   Head: Normocephalic and atraumatic.   Right Ear: External ear normal.   Left Ear: External ear normal.   Eyes: Conjunctivae are normal. Pupils are equal, round, and reactive to light.   Neck: Neck supple.   Normal range of motion.  Cardiovascular:  Normal rate, regular rhythm and normal heart sounds.           Pulmonary/Chest: Breath sounds normal. She has no wheezes. She has no rhonchi. She has no rales.   Right breast exhibits tenderness. Right breast exhibits no inverted nipple, no nipple discharge and no skin change. Left breast exhibits tenderness. Left breast exhibits no inverted nipple, no nipple discharge and no skin change. There is breast swelling.   Left breast tenderness with open wound at 9 o'clock position. Mild induration without any fluctuance or drainage.  Right breast with 2 cm area of tenderness. Without erythema or drainage noted.   Abdominal: Abdomen is soft. Bowel sounds are normal. There is no abdominal tenderness.   Genitourinary: There is breast swelling.   Musculoskeletal:         General: Normal range of motion.      Cervical back: Normal range of motion and neck supple.     Neurological: She is alert and oriented to person, place, and time.   Skin: Skin is warm and dry.   Psychiatric: She has a normal mood and affect.       ED Course   Procedures  Labs Reviewed   PREGNANCY TEST, URINE RAPID - Normal   URINALYSIS, REFLEX TO URINE CULTURE          Imaging Results    None          Medications   ketorolac injection 60 mg (60 mg Intramuscular Given 11/29/22 2029)     Medical Decision Making:   Differential Diagnosis:   Breast abscess, breast pain, breast neoplasm  ED Management:  30-year-old female presents to ED for evaluation of bilateral breast pain due to possible abscess.  Patient states that she is had multiple abscesses over the last 6 years.  Not currently following with gyn or breast surgeon.  States she  is been having intermittent drainage from both breast and nipple.  Nipples are not inverted with no skin changes noted.  No indication for I and D at this time.  Will place on antibiotics to cover for infection and give short course of pain medication.  Referral sent for breast surgery for patient to follow-up.  Return ED precautions given.  Patient verbalized understanding and agrees with plan of care.                        Clinical Impression:   Final diagnoses:  [N61.1] Breast abscess (Primary)        ED Disposition Condition    Discharge Stable          ED Prescriptions       Medication Sig Dispense Start Date End Date Auth. Provider    HYDROcodone-acetaminophen (NORCO) 5-325 mg per tablet Take 1 tablet by mouth every 6 (six) hours as needed for Pain. 20 tablet 11/29/2022 12/4/2022 TRINA Meyer    ketorolac (TORADOL) 10 mg tablet Take 1 tablet (10 mg total) by mouth every 6 (six) hours as needed for Pain. 20 tablet 11/29/2022 12/4/2022 TRINA Meyer    sulfamethoxazole-trimethoprim 800-160mg (BACTRIM DS) 800-160 mg Tab Take 1 tablet by mouth 2 (two) times daily. for 7 days 14 tablet 11/29/2022 12/6/2022 TRINA Meyer          Follow-up Information       Follow up With Specialties Details Why Contact Info    PCP  In 1 week As needed     Shraddha Mike MD Breast Surgery, General Surgery Call in 1 day  [address] [facility] [address] [phone]               TRINA Meyer  11/29/22 2035

## 2022-12-08 ENCOUNTER — OFFICE VISIT (OUTPATIENT)
Dept: SURGERY | Facility: CLINIC | Age: 32
End: 2022-12-08
Payer: MEDICAID

## 2022-12-08 VITALS
BODY MASS INDEX: 50.02 KG/M2 | HEIGHT: 64 IN | OXYGEN SATURATION: 100 % | DIASTOLIC BLOOD PRESSURE: 90 MMHG | SYSTOLIC BLOOD PRESSURE: 140 MMHG | HEART RATE: 88 BPM | RESPIRATION RATE: 18 BRPM | TEMPERATURE: 98 F | WEIGHT: 293 LBS

## 2022-12-08 DIAGNOSIS — N61.1 BREAST ABSCESS: ICD-10-CM

## 2022-12-08 PROCEDURE — 99214 OFFICE O/P EST MOD 30 MIN: CPT | Mod: PBBFAC

## 2022-12-08 NOTE — PROGRESS NOTES
[facility] General Surgery Clinic Note    HPI: Taylor Crain is a 32 y.o. with no PMH that is here for evaluation of recurrent bilateral breast abscesses. For 6 years, she has had a recurrent abscess in the left breast. This past year, she started having abscesses in the right breast. She has had to get them drained 4 times and is frequently treated with antibiotics. Most recently, on 11/29/22, she went to the ED for this problem and was started on Bactrim. She finished her antibiotics and the abscess has improved. She is still having some pain and drainage. No f/c/n/v.    She had a mammogram in 02/10/2021 that was benign. Surgical history of 3 c-sections and 1 D&C. Family history significant for 2 aunts with breast caner diagnosed in their 40s. Last month, she started smoking 2-3 cigarettes per day.    PMH: History reviewed. No pertinent past medical history.   Meds: No current outpatient medications on file.  Allergies: Review of patient's allergies indicates:  No Known Allergies  Social History:   Social History     Tobacco Use    Smoking status: Some Days     Types: Cigarettes    Smokeless tobacco: Never   Substance Use Topics    Alcohol use: Never    Drug use: Never     Family History: No family history on file.  Surgical History:   Past Surgical History:   Procedure Laterality Date    DILATION AND CURETTAGE OF UTERUS      US ASPIRATION ABSCESS HEMATOMA SEROMA (BREAST IMAGING)       Review of Systems:  Negative except per HPI    Objective:    Vitals:  Vitals:    12/08/22 1359   BP: (!) 140/90   Pulse: 88   Resp: 18   Temp: 97.9 °F (36.6 °C)     Physical Exam:  Gen: NAD  Neuro: awake, alert, answering questions appropriately  CV: RRR  Resp: non-labored breathing, WENDI  Abd: soft, ND, NT  Ext: moves all 4 spontaneously and purposefully  Skin: warm, well perfused  Breast: There is a approx 1 cm hard, mobile nodule felt on L breast medial to the nipple, located where the patient had her most recent abscess. Moderate  amount of serious discharge from the L nipple upon manipulation of nodule. Mildly TTP. No masses felt elsewhere in the breast.      Assessment/Plan:  Taylor Crain is a 32 y.o. with no PMH that is here for evaluation of recurrent bilateral breast abscesses.    - Recommended daily cleaning of the breast with soap & water  - Recommended smoking cessation  - Provided ED precautions: fevers, chills, enlarging abscess, etc.  - RTC 3 months for f/u    Rosalie Castro MS3  12/08/2022 2:51 PM

## 2022-12-08 NOTE — LETTER
December 8, 2022      [facility]  [address] [facility] [address]  Phone: [phone]       Patient: Taylor Crain   YOB: 1990  Date of Visit: 12/08/2022    To Whom It May Concern:    Mariama Crain  Was seen  at [facility] on 12/08/2022. The patient may return to work on 12/09/2022 with no restrictions. If you have any questions or concerns, or if I can be of further assistance, please do not hesitate to contact me.    Sincerely,    Gianna Culp MA

## 2022-12-08 NOTE — PROGRESS NOTES
[facility] General Surgery Clinic Note     HPI: Taylor Crain is a 32 y.o. with no PMH that is here for evaluation of recurrent bilateral breast abscesses. For 6 years, she has had a recurrent abscess in the left breast. This past year, she started having abscesses in the right breast. She has had to get them drained 4 times and is frequently treated with antibiotics. Most recently, on 11/29/22, she went to the ED for this problem and was started on Bactrim. She finished her antibiotics and the abscess has improved. She is still having some pain and drainage. No f/c/n/v.     She had a mammogram in 02/10/2021 that was benign. Surgical history of 3 c-sections and 1 D&C. Family history significant for 2 aunts with breast caner diagnosed in their 40s. Last month, she started smoking 2-3 cigarettes per day.     PMH: History reviewed. No pertinent past medical history.   Meds: No current outpatient medications on file.  Allergies: Review of patient's allergies indicates:  No Known Allergies  Social History:   Social History            Tobacco Use    Smoking status: Some Days       Types: Cigarettes    Smokeless tobacco: Never   Substance Use Topics    Alcohol use: Never    Drug use: Never      Family History: No family history on file.  Surgical History:         Past Surgical History:   Procedure Laterality Date    DILATION AND CURETTAGE OF UTERUS        US ASPIRATION ABSCESS HEMATOMA SEROMA (BREAST IMAGING)          Review of Systems:  Negative except per HPI     Objective:     Vitals:      Vitals:     12/08/22 1359   BP: (!) 140/90   Pulse: 88   Resp: 18   Temp: 97.9 °F (36.6 °C)      Physical Exam:  Gen: NAD  Neuro: awake, alert, answering questions appropriately  CV: RRR  Resp: non-labored breathing, WENDI  Abd: soft, ND, NT  Ext: moves all 4 spontaneously and purposefully  Skin: warm, well perfused  Breast: There is a approx 1 cm hard, mobile nodule felt on L breast medial to the nipple, located where the patient had her  most recent abscess. Moderate amount of serious discharge from the L nipple upon manipulation of nodule. Mildly TTP. No masses felt elsewhere in the breast.        Assessment/Plan:  Taylor Crain is a 32 y.o. with no PMH that is here for evaluation of recurrent bilateral breast abscesses.     - Recommended daily cleaning of the breast with soap & water  - Recommended smoking cessation  - Provided ED precautions: fevers, chills, enlarging abscess, etc.  - RTC 3 months for f/u     Rosalie Castro MS3  12/08/2022 2:51 PM      I have seen the patient with the medical student. I have reviewed and edited this note as appropriate.   RTC in 3 mos    Tunde Al MD  [employer] General Surgery PGY1

## 2022-12-24 ENCOUNTER — HOSPITAL ENCOUNTER (EMERGENCY)
Facility: HOSPITAL | Age: 32
Discharge: HOME | End: 2022-12-24
Attending: FAMILY MEDICINE
Payer: MEDICAID

## 2022-12-24 VITALS
HEIGHT: 64 IN | BODY MASS INDEX: 50.02 KG/M2 | HEART RATE: 75 BPM | TEMPERATURE: 99 F | SYSTOLIC BLOOD PRESSURE: 127 MMHG | WEIGHT: 293 LBS | DIASTOLIC BLOOD PRESSURE: 77 MMHG | RESPIRATION RATE: 18 BRPM | OXYGEN SATURATION: 98 %

## 2022-12-24 DIAGNOSIS — B34.9 VIRAL SYNDROME: Primary | ICD-10-CM

## 2022-12-24 LAB
FLUAV AG UPPER RESP QL IA.RAPID: NOT DETECTED
FLUBV AG UPPER RESP QL IA.RAPID: NOT DETECTED
SARS-COV-2 RNA RESP QL NAA+PROBE: NOT DETECTED
STREP A PCR (OHS): NOT DETECTED

## 2022-12-24 PROCEDURE — 25000003 PHARM REV CODE 250

## 2022-12-24 PROCEDURE — 99283 EMERGENCY DEPT VISIT LOW MDM: CPT | Mod: 25

## 2022-12-24 PROCEDURE — 87651 STREP A DNA AMP PROBE: CPT

## 2022-12-24 PROCEDURE — 0240U COVID/FLU A&B PCR: CPT

## 2022-12-24 RX ORDER — DICLOFENAC SODIUM 50 MG/1
50 TABLET, DELAYED RELEASE ORAL 2 TIMES DAILY PRN
Qty: 14 TABLET | Refills: 0 | Status: SHIPPED | OUTPATIENT
Start: 2022-12-24 | End: 2022-12-31

## 2022-12-24 RX ORDER — KETOROLAC TROMETHAMINE 10 MG/1
10 TABLET, FILM COATED ORAL
Status: COMPLETED | OUTPATIENT
Start: 2022-12-24 | End: 2022-12-24

## 2022-12-24 RX ADMIN — KETOROLAC TROMETHAMINE 10 MG: 10 TABLET, FILM COATED ORAL at 07:12

## 2022-12-24 NOTE — Clinical Note
"Taylor Watson"Paras was seen and treated in our emergency department on 12/24/2022.  She may return to work on 12/26/2022.       If you have any questions or concerns, please don't hesitate to call.      Ghazal Camacho NP"

## 2022-12-25 NOTE — DISCHARGE INSTRUCTIONS
Drink plenty of fluids. Use allergy medicine and Flonase daily. May gargle with salt water. Use ibuprofen and tylenol for pain/fever every 4-6hours.

## 2023-01-04 ENCOUNTER — HOSPITAL ENCOUNTER (EMERGENCY)
Facility: HOSPITAL | Age: 33
Discharge: HOME | End: 2023-01-04
Attending: STUDENT IN AN ORGANIZED HEALTH CARE EDUCATION/TRAINING PROGRAM
Payer: MEDICAID

## 2023-01-04 VITALS
OXYGEN SATURATION: 99 % | BODY MASS INDEX: 45.93 KG/M2 | RESPIRATION RATE: 18 BRPM | HEIGHT: 64 IN | SYSTOLIC BLOOD PRESSURE: 169 MMHG | DIASTOLIC BLOOD PRESSURE: 106 MMHG | TEMPERATURE: 98 F | WEIGHT: 269 LBS | HEART RATE: 87 BPM

## 2023-01-04 DIAGNOSIS — M17.11 ARTHRITIS OF RIGHT KNEE: Primary | ICD-10-CM

## 2023-01-04 DIAGNOSIS — E66.01 MORBIDLY OBESE: ICD-10-CM

## 2023-01-04 DIAGNOSIS — M25.561 RIGHT KNEE PAIN: ICD-10-CM

## 2023-01-04 PROCEDURE — 99283 EMERGENCY DEPT VISIT LOW MDM: CPT

## 2023-01-04 RX ORDER — HYDROCODONE BITARTRATE AND ACETAMINOPHEN 5; 325 MG/1; MG/1
1 TABLET ORAL EVERY 6 HOURS PRN
Qty: 12 TABLET | Refills: 0 | Status: SHIPPED | OUTPATIENT
Start: 2023-01-04 | End: 2023-01-07

## 2023-01-04 NOTE — ED PROVIDER NOTES
Encounter Date: 1/4/2023       History     Chief Complaint   Patient presents with    Knee Pain     C/o right knee pain x 4 days. No injury. OTC not working.     The history is provided by the patient.   Knee Pain  This is a new problem. The current episode started more than 2 days ago. The problem occurs constantly. The problem has not changed since onset.Pertinent negatives include no chest pain, no abdominal pain, no headaches and no shortness of breath. The symptoms are aggravated by walking, bending and standing. Nothing relieves the symptoms. She has tried a cold compress, acetaminophen, rest and a warm compress for the symptoms. The treatment provided mild relief.   Review of patient's allergies indicates:  No Known Allergies  Past Medical History:   Diagnosis Date    Morbid obesity      Past Surgical History:   Procedure Laterality Date    DILATION AND CURETTAGE OF UTERUS      DILATION AND CURETTAGE OF UTERUS      US ASPIRATION ABSCESS HEMATOMA SEROMA (BREAST IMAGING)       History reviewed. No pertinent family history.  Social History     Tobacco Use    Smoking status: Some Days     Types: Cigarettes    Smokeless tobacco: Never   Substance Use Topics    Alcohol use: Never    Drug use: Never     Review of Systems   Constitutional:  Negative for fever.   Respiratory:  Negative for shortness of breath.    Cardiovascular:  Negative for chest pain.   Gastrointestinal:  Negative for abdominal pain.   Musculoskeletal:         Per hpi   Neurological:  Negative for headaches.   All other systems reviewed and are negative.    Physical Exam     Initial Vitals [01/04/23 0816]   BP Pulse Resp Temp SpO2   (!) 169/106 87 18 98.4 °F (36.9 °C) 99 %      MAP       --         Physical Exam    Nursing note and vitals reviewed.  Constitutional: She appears well-developed and well-nourished. She is Obese . No distress.   Cardiovascular:  Normal rate, regular rhythm and intact distal pulses.           Pulmonary/Chest: Breath  sounds normal. No respiratory distress.   Abdominal: Abdomen is soft. Bowel sounds are normal. There is no abdominal tenderness.   Musculoskeletal:      Right knee: Bony tenderness and crepitus present. No swelling or effusion. Decreased range of motion. Tenderness present over the lateral joint line.      Left knee: Normal.     Neurological: She is alert and oriented to person, place, and time.   Skin: Skin is warm. Capillary refill takes less than 2 seconds.   Psychiatric: She has a normal mood and affect. Thought content normal.       ED Course   Procedures  Labs Reviewed - No data to display       Imaging Results              X-Ray Knee 3 View Right (Preliminary result)  Result time 01/04/23 09:46:13      ED Interpretation by Igor Maynard MD (01/04/23 09:46:13, [facility] - Emergency Dept, Emergency Medicine)    Mild arthritic changes.  No fractures.                                     Medications - No data to display  Medical Decision Making:   Differential Diagnosis:   Internal knee derangement, arthritis   Medical Decision Making  Problems Addressed:  Arthritis of right knee: undiagnosed new problem with uncertain prognosis  Morbidly obese: chronic illness or injury    Amount and/or Complexity of Data Reviewed  Radiology: ordered and independent interpretation performed. Decision-making details documented in ED Course.    Risk  OTC drugs.  Prescription drug management.                           Clinical Impression:   Final diagnoses:  [M25.561] Right knee pain  [M17.11] Arthritis of right knee (Primary)  [E66.01] Morbidly obese        ED Disposition Condition    Discharge Stable          ED Prescriptions       Medication Sig Dispense Start Date End Date Auth. Provider    HYDROcodone-acetaminophen (NORCO) 5-325 mg per tablet Take 1 tablet by mouth every 6 (six) hours as needed for Pain. 12 tablet 1/4/2023 1/7/2023 Igor Maynard MD          Follow-up Information       Follow up  With Specialties Details Why Contact Info    [facility] - Emergency Dept Emergency Medicine Go to  If symptoms worsen [address]  [facility] [address] [phone]    [facility] Orthopedics Call   [address] [address] [phone]             Igor Maynard MD  01/04/23 8956       Igor Maynard MD  01/04/23 3461

## 2023-01-04 NOTE — Clinical Note
"Taylor Bunnviviana Crain was seen and treated in our emergency department on 1/4/2023.  She may return to work on 01/07/2023.       If you have any questions or concerns, please don't hesitate to call.      Igor Maynard MD"

## 2023-01-17 ENCOUNTER — HOSPITAL ENCOUNTER (EMERGENCY)
Facility: HOSPITAL | Age: 33
Discharge: LEFT BEFORE DISCHARGE | End: 2023-01-17
Payer: MEDICAID

## 2023-01-17 ENCOUNTER — HOSPITAL ENCOUNTER (EMERGENCY)
Facility: HOSPITAL | Age: 33
Discharge: HOME | End: 2023-01-17
Attending: EMERGENCY MEDICINE
Payer: MEDICAID

## 2023-01-17 VITALS
OXYGEN SATURATION: 98 % | DIASTOLIC BLOOD PRESSURE: 71 MMHG | HEIGHT: 64 IN | SYSTOLIC BLOOD PRESSURE: 115 MMHG | BODY MASS INDEX: 50.02 KG/M2 | HEART RATE: 71 BPM | TEMPERATURE: 98 F | WEIGHT: 293 LBS | RESPIRATION RATE: 18 BRPM

## 2023-01-17 DIAGNOSIS — R10.84 GENERALIZED ABDOMINAL PAIN: Primary | ICD-10-CM

## 2023-01-17 LAB
ALBUMIN SERPL-MCNC: 3.9 G/DL (ref 3.5–5)
ALBUMIN/GLOB SERPL: 1.2 RATIO (ref 1.1–2)
ALP SERPL-CCNC: 75 UNIT/L (ref 40–150)
ALT SERPL-CCNC: 11 UNIT/L (ref 0–55)
AMORPH PHOS CRY URNS QL MICRO: ABNORMAL /HPF
ANISOCYTOSIS BLD QL SMEAR: ABNORMAL
APPEARANCE UR: CLEAR
AST SERPL-CCNC: 16 UNIT/L (ref 5–34)
B-HCG SERPL QL: NEGATIVE
BACTERIA #/AREA URNS AUTO: ABNORMAL /HPF
BASOPHILS # BLD AUTO: 0.02 X10(3)/MCL (ref 0–0.2)
BASOPHILS NFR BLD AUTO: 0.2 %
BILIRUB UR QL STRIP.AUTO: NEGATIVE MG/DL
BILIRUBIN DIRECT+TOT PNL SERPL-MCNC: 0.4 MG/DL
BUN SERPL-MCNC: 8.5 MG/DL (ref 7–18.7)
CALCIUM SERPL-MCNC: 9.5 MG/DL (ref 8.4–10.2)
CHLORIDE SERPL-SCNC: 106 MMOL/L (ref 98–107)
CO2 SERPL-SCNC: 23 MMOL/L (ref 22–29)
COLOR UR AUTO: YELLOW
CREAT SERPL-MCNC: 0.71 MG/DL (ref 0.55–1.02)
EOSINOPHIL # BLD AUTO: 0.1 X10(3)/MCL (ref 0–0.9)
EOSINOPHIL NFR BLD AUTO: 0.8 %
ERYTHROCYTE [DISTWIDTH] IN BLOOD BY AUTOMATED COUNT: 15.6 % (ref 11.5–17)
GFR SERPLBLD CREATININE-BSD FMLA CKD-EPI: >60 MLS/MIN/1.73/M2
GLOBULIN SER-MCNC: 3.3 GM/DL (ref 2.4–3.5)
GLUCOSE SERPL-MCNC: 97 MG/DL (ref 74–100)
GLUCOSE UR QL STRIP.AUTO: NEGATIVE MG/DL
HCT VFR BLD AUTO: 39.3 % (ref 37–47)
HGB BLD-MCNC: 11.9 GM/DL (ref 12–16)
HYPOCHROMIA BLD QL SMEAR: ABNORMAL
IMM GRANULOCYTES # BLD AUTO: 0.05 X10(3)/MCL (ref 0–0.04)
IMM GRANULOCYTES NFR BLD AUTO: 0.4 %
KETONES UR QL STRIP.AUTO: NEGATIVE MG/DL
LEUKOCYTE ESTERASE UR QL STRIP.AUTO: NEGATIVE UNIT/L
LIPASE SERPL-CCNC: 12 U/L
LYMPHOCYTES # BLD AUTO: 1.44 X10(3)/MCL (ref 0.6–4.6)
LYMPHOCYTES NFR BLD AUTO: 11.4 %
MAGNESIUM SERPL-MCNC: 1.6 MG/DL (ref 1.6–2.6)
MCH RBC QN AUTO: 22.2 PG
MCHC RBC AUTO-ENTMCNC: 30.3 MG/DL (ref 33–36)
MCV RBC AUTO: 73.2 FL (ref 80–94)
MICROCYTES BLD QL SMEAR: ABNORMAL
MONOCYTES # BLD AUTO: 0.56 X10(3)/MCL (ref 0.1–1.3)
MONOCYTES NFR BLD AUTO: 4.5 %
NEUTROPHILS # BLD AUTO: 10.41 X10(3)/MCL (ref 2.1–9.2)
NEUTROPHILS NFR BLD AUTO: 82.7 %
NITRITE UR QL STRIP.AUTO: NEGATIVE
NRBC BLD AUTO-RTO: 0 %
PH UR STRIP.AUTO: 7.5 [PH]
PLATELET # BLD AUTO: 335 X10(3)/MCL (ref 130–400)
PLATELET # BLD EST: ADEQUATE 10*3/UL
PMV BLD AUTO: 10.8 FL (ref 7.4–10.4)
POTASSIUM SERPL-SCNC: 4 MMOL/L (ref 3.5–5.1)
PROT SERPL-MCNC: 7.2 GM/DL (ref 6.4–8.3)
PROT UR QL STRIP.AUTO: NEGATIVE MG/DL
RBC # BLD AUTO: 5.37 X10(6)/MCL (ref 4.2–5.4)
RBC #/AREA URNS AUTO: ABNORMAL /HPF
RBC MORPH BLD: ABNORMAL
RBC UR QL AUTO: ABNORMAL UNIT/L
SODIUM SERPL-SCNC: 140 MMOL/L (ref 136–145)
SP GR UR STRIP.AUTO: 1.02
SQUAMOUS #/AREA URNS AUTO: ABNORMAL /HPF
UROBILINOGEN UR STRIP-ACNC: 0.2 MG/DL
WBC # SPEC AUTO: 12.6 X10(3)/MCL (ref 4.5–11.5)
WBC #/AREA URNS AUTO: ABNORMAL /HPF

## 2023-01-17 PROCEDURE — 99285 EMERGENCY DEPT VISIT HI MDM: CPT | Mod: 25

## 2023-01-17 PROCEDURE — 63600175 PHARM REV CODE 636 W HCPCS: Performed by: EMERGENCY MEDICINE

## 2023-01-17 PROCEDURE — 99281 EMR DPT VST MAYX REQ PHY/QHP: CPT | Mod: 25,27

## 2023-01-17 PROCEDURE — 25500020 PHARM REV CODE 255: Performed by: EMERGENCY MEDICINE

## 2023-01-17 PROCEDURE — 85025 COMPLETE CBC W/AUTO DIFF WBC: CPT | Performed by: EMERGENCY MEDICINE

## 2023-01-17 PROCEDURE — 80053 COMPREHEN METABOLIC PANEL: CPT | Performed by: EMERGENCY MEDICINE

## 2023-01-17 PROCEDURE — 81003 URINALYSIS AUTO W/O SCOPE: CPT | Performed by: EMERGENCY MEDICINE

## 2023-01-17 PROCEDURE — 96374 THER/PROPH/DIAG INJ IV PUSH: CPT

## 2023-01-17 PROCEDURE — 81025 URINE PREGNANCY TEST: CPT | Performed by: EMERGENCY MEDICINE

## 2023-01-17 PROCEDURE — 83735 ASSAY OF MAGNESIUM: CPT | Performed by: EMERGENCY MEDICINE

## 2023-01-17 PROCEDURE — 83690 ASSAY OF LIPASE: CPT | Performed by: EMERGENCY MEDICINE

## 2023-01-17 PROCEDURE — 96375 TX/PRO/DX INJ NEW DRUG ADDON: CPT

## 2023-01-17 RX ORDER — HYDROCODONE BITARTRATE AND ACETAMINOPHEN 7.5; 325 MG/1; MG/1
1 TABLET ORAL EVERY 6 HOURS PRN
Qty: 20 TABLET | Refills: 0 | Status: SHIPPED | OUTPATIENT
Start: 2023-01-17 | End: 2023-01-22

## 2023-01-17 RX ORDER — FENTANYL CITRATE 50 UG/ML
100 INJECTION, SOLUTION INTRAMUSCULAR; INTRAVENOUS
Status: COMPLETED | OUTPATIENT
Start: 2023-01-17 | End: 2023-01-17

## 2023-01-17 RX ORDER — DICYCLOMINE HYDROCHLORIDE 20 MG/1
20 TABLET ORAL 4 TIMES DAILY
Qty: 40 TABLET | Refills: 0 | Status: SHIPPED | OUTPATIENT
Start: 2023-01-17 | End: 2023-01-27

## 2023-01-17 RX ORDER — ONDANSETRON 2 MG/ML
4 INJECTION INTRAMUSCULAR; INTRAVENOUS
Status: COMPLETED | OUTPATIENT
Start: 2023-01-17 | End: 2023-01-17

## 2023-01-17 RX ORDER — DROPERIDOL 2.5 MG/ML
2.5 INJECTION, SOLUTION INTRAMUSCULAR; INTRAVENOUS
Status: COMPLETED | OUTPATIENT
Start: 2023-01-17 | End: 2023-01-17

## 2023-01-17 RX ORDER — MORPHINE SULFATE 4 MG/ML
4 INJECTION, SOLUTION INTRAMUSCULAR; INTRAVENOUS
Status: COMPLETED | OUTPATIENT
Start: 2023-01-17 | End: 2023-01-17

## 2023-01-17 RX ORDER — DIPHENHYDRAMINE HYDROCHLORIDE 50 MG/ML
25 INJECTION INTRAMUSCULAR; INTRAVENOUS
Status: COMPLETED | OUTPATIENT
Start: 2023-01-17 | End: 2023-01-17

## 2023-01-17 RX ADMIN — DROPERIDOL 2.5 MG: 2.5 INJECTION, SOLUTION INTRAMUSCULAR; INTRAVENOUS at 03:01

## 2023-01-17 RX ADMIN — FENTANYL CITRATE 100 MCG: 50 INJECTION, SOLUTION INTRAMUSCULAR; INTRAVENOUS at 12:01

## 2023-01-17 RX ADMIN — FENTANYL CITRATE 100 MCG: 50 INJECTION, SOLUTION INTRAMUSCULAR; INTRAVENOUS at 02:01

## 2023-01-17 RX ADMIN — IOPAMIDOL 100 ML: 755 INJECTION, SOLUTION INTRAVENOUS at 03:01

## 2023-01-17 RX ADMIN — SODIUM CHLORIDE, POTASSIUM CHLORIDE, SODIUM LACTATE AND CALCIUM CHLORIDE 1000 ML: 600; 310; 30; 20 INJECTION, SOLUTION INTRAVENOUS at 12:01

## 2023-01-17 RX ADMIN — ONDANSETRON 4 MG: 2 INJECTION INTRAMUSCULAR; INTRAVENOUS at 12:01

## 2023-01-17 RX ADMIN — MORPHINE SULFATE 4 MG: 4 INJECTION INTRAVENOUS at 03:01

## 2023-01-17 RX ADMIN — DIPHENHYDRAMINE HYDROCHLORIDE 25 MG: 50 INJECTION, SOLUTION INTRAMUSCULAR; INTRAVENOUS at 03:01

## 2023-01-17 NOTE — ED PROVIDER NOTES
Encounter Date: 1/17/2023       History     Chief Complaint   Patient presents with    Abdominal Pain     Pt complaint of pain radiating from the back towards the lower abd area since 0730. Pt presents in apparent painand crying at traige     The history is provided by the patient and a friend. No  was used.   Abdominal Pain  The current episode started 3 to 5 hours ago. The onset of the illness was abrupt. The problem has been gradually worsening. The abdominal pain is generalized. The abdominal pain does not radiate. The severity of the abdominal pain is 10/10. The abdominal pain is relieved by nothing. The other symptoms of the illness do not include fever, shortness of breath, nausea, vomiting, diarrhea, dysuria, vaginal discharge or vaginal bleeding.   The patient states that she believes she is currently not pregnant. The patient has not had a change in bowel habit. Symptoms associated with the illness do not include back pain.   No prior h/o similar symptoms.    Review of patient's allergies indicates:  No Known Allergies  Past Medical History:   Diagnosis Date    Morbid obesity      Past Surgical History:   Procedure Laterality Date    DILATION AND CURETTAGE OF UTERUS      DILATION AND CURETTAGE OF UTERUS      US ASPIRATION ABSCESS HEMATOMA SEROMA (BREAST IMAGING)       No family history on file.  Social History     Tobacco Use    Smoking status: Some Days     Types: Cigarettes    Smokeless tobacco: Never   Substance Use Topics    Alcohol use: Never    Drug use: Never     Review of Systems   Constitutional:  Negative for fever.   HENT:  Negative for sore throat.    Respiratory:  Negative for shortness of breath.    Cardiovascular:  Negative for chest pain.   Gastrointestinal:  Positive for abdominal pain. Negative for diarrhea, nausea and vomiting.   Genitourinary:  Negative for dysuria, vaginal bleeding and vaginal discharge.   Musculoskeletal:  Negative for back pain.   Skin:  Negative  for rash.   Neurological:  Negative for weakness.   Hematological:  Does not bruise/bleed easily.     Physical Exam     Initial Vitals [01/17/23 1134]   BP Pulse Resp Temp SpO2   (!) 154/121 (!) 9 (!) 22 98.1 °F (36.7 °C) 98 %      MAP       --         Physical Exam    Nursing note and vitals reviewed.  Constitutional: She appears well-developed and well-nourished. She appears distressed (appears very uncomfortable).   HENT:   Head: Normocephalic and atraumatic.   Right Ear: External ear normal.   Left Ear: External ear normal.   Eyes: Conjunctivae and EOM are normal. Pupils are equal, round, and reactive to light.   Neck: Neck supple.   Normal range of motion.  Cardiovascular:  Normal rate, regular rhythm, normal heart sounds and intact distal pulses.           Pulmonary/Chest: Breath sounds normal.   Abdominal: Abdomen is soft. Bowel sounds are decreased. There is generalized abdominal tenderness.     There is guarding. There is no rebound.   Musculoskeletal:         General: Normal range of motion.      Cervical back: Normal range of motion and neck supple.     Neurological: She is alert and oriented to person, place, and time. GCS score is 15. GCS eye subscore is 4. GCS verbal subscore is 5. GCS motor subscore is 6.   Skin: Skin is warm and dry. Capillary refill takes less than 2 seconds.   Psychiatric: She has a normal mood and affect. Her behavior is normal. Judgment and thought content normal.       ED Course   Procedures  Labs Reviewed   URINALYSIS, REFLEX TO URINE CULTURE - Abnormal; Notable for the following components:       Result Value    Blood, UA Moderate (*)     All other components within normal limits   CBC WITH DIFFERENTIAL - Abnormal; Notable for the following components:    WBC 12.6 (*)     Hgb 11.9 (*)     MCV 73.2 (*)     MCHC 30.3 (*)     MPV 10.8 (*)     Neut # 10.41 (*)     IG# 0.05 (*)     All other components within normal limits   BLOOD SMEAR MICROSCOPIC EXAM ([facility]) - Abnormal; Notable  for the following components:    RBC Morph Abnormal (*)     Anisocyte 1+ (*)     Hypochrom 2+ (*)     Microcyte 2+ (*)     All other components within normal limits   URINALYSIS, MICROSCOPIC - Abnormal; Notable for the following components:    Bacteria, UA Few (*)     Amorphous Phosphate Crystals, UA Few (*)     Squamous Epithelial Cells, UA Moderate (*)     All other components within normal limits   LIPASE - Normal   MAGNESIUM - Normal   PREGNANCY TEST, URINE RAPID - Normal   CBC W/ AUTO DIFFERENTIAL    Narrative:     The following orders were created for panel order CBC auto differential.  Procedure                               Abnormality         Status                     ---------                               -----------         ------                     CBC with Differential[[id number]]        Abnormal            Final result                 Please view results for these tests on the individual orders.   COMPREHENSIVE METABOLIC PANEL          Imaging Results              CT Abdomen Pelvis With Contrast (Final result)  Result time 01/17/23 15:22:00      Final result by Liza Morales MD (01/17/23 15:22:00)                   Impression:      Mild bibasilar atelectasis    Hepatomegaly and hepatic steatosis    Otherwise unremarkable      Electronically signed by: Liza Morales  Date:    01/17/2023  Time:    15:22               Narrative:    EXAMINATION:  CT ABDOMEN PELVIS WITH CONTRAST    CLINICAL HISTORY:  Abdominal pain, acute, nonlocalized;    TECHNIQUE:  Low dose axial images, sagittal and coronal reformations were obtained from the lung bases to the pubic symphysis following the IV administration of contrast. Automatic exposure control (AEC) is utilized to reduce patient radiation exposure.    COMPARISON:  10/05/2017    FINDINGS:  There is mild bibasilar atelectasis..    The liver is slightly enlarged in size and there is evidence of hepatic steatosis.  No liver mass or lesion is seen.  Portal  and hepatic veins appear normal.    The gallbladder appears normal.  No obvious gallstones are seen.  No biliary dilatation is seen.  No pericholecystic fluid is seen.    The pancreas appears normal.  No pancreatic mass or lesion is seen.    The spleen shows no acute abnormality.    The adrenal glands appear normal.  No adrenal nodule is seen.    The kidneys appear normal.  No hydronephrosis is seen.  No hydroureter is seen.  No nephrolithiasis is seen.  No obvious ureteral stones are seen.    Urinary bladder appears grossly unremarkable.    No colitis is seen.  No diverticulitis is seen.  No obvious colonic mass or lesion is seen.  The appendix is seen and appears normal    No free air is seen.  No free fluid is seen.                                       Medications   lactated ringers bolus 1,000 mL (0 mLs Intravenous Stopped 1/17/23 1345)   fentaNYL injection 100 mcg (100 mcg Intravenous Given 1/17/23 1245)   ondansetron injection 4 mg (4 mg Intravenous Given 1/17/23 1245)   fentaNYL injection 100 mcg (100 mcg Intravenous Given 1/17/23 1412)   iopamidoL (ISOVUE-370) injection 100 mL (100 mLs Intravenous Given 1/17/23 1515)   morphine injection 4 mg (4 mg Intravenous Given 1/17/23 1537)   droperidoL injection 2.5 mg (2.5 mg Intravenous Given 1/17/23 1541)   diphenhydrAMINE injection 25 mg (25 mg Intravenous Given 1/17/23 1541)         Differential is broad, and includes: SBO, perforated viscous, appendicitis, ruptured ovarian cyst, UTI, renal colic, pancreatitis, biliary disease, diverticulitis, ectopic pregnancy, endometriosis              Work-up reveals mild leukocytosis, which likely represents demargination, as CT was negative for intra-abdominal pathology.  Will treat symptomatically and D/C home.       Clinical Impression:   Final diagnoses:  [R10.84] Generalized abdominal pain (Primary)        ED Disposition Condition    Discharge Stable          ED Prescriptions       Medication Sig Dispense Start Date  End Date Auth. Provider    dicyclomine (BENTYL) 20 mg tablet Take 1 tablet (20 mg total) by mouth 4 (four) times daily. for 10 days 40 tablet 1/17/2023 1/27/2023 Davi Link MD    HYDROcodone-acetaminophen (NORCO) 7.5-325 mg per tablet Take 1 tablet by mouth every 6 (six) hours as needed for Pain. 20 tablet 1/17/2023 1/22/2023 Davi Link MD          Follow-up Information    None          Davi Link MD  01/17/23 [id number]

## 2023-01-17 NOTE — ED TRIAGE NOTES
Pt complaint of pain radiating from the back towards the lower abd area since 0730. Pt presents in apparent painand crying at traige

## 2023-01-19 ENCOUNTER — PATIENT OUTREACH (OUTPATIENT)
Dept: EMERGENCY MEDICINE | Facility: HOSPITAL | Age: 33
End: 2023-01-19
Payer: MEDICAID

## 2023-01-20 NOTE — PATIENT INSTRUCTIONS
If you have any questions call Susanna [phone].     Why Should I Have My Own Doctor or Nurse Practitioner (PCP) to Take Care of Me  What is a PCP (Primary Care Provider)?    A primary care provider is a doctor or nurse practitioner who you can call for an appointment and will see you when you are sick.    You will also be seen at scheduled appointment times during the year to check on your diabetes, or high blood pressure, or heart disease.    Why see the same PCP (doctor/nurse practitioner)?    You can be seen faster when you are sick           You, the PCP (doctor/nurse practitioner) and the office staff get to know each other; you begin to trust them to care for you. You take part in your health choices.   All of you together are a team.    Your medicine is looked at every time you visit, to be sure you are taking the medicine, as the PCP (doctor/nurse practitioner) ordered.    Your PCP (doctor/nurse practitioner) and their staff help keep you healthy and out of the hospital.  They can catch sicknesses earlier by ordering tests once a year to stop or prevent the sickness from getting worse.      Your PCP (doctor/nurse practitioner) can send you to providers who specialize (heart/bone/lung) if you need.  They and their office staff help keep track of your seeing other providers (doctors/nurse practitioners) and tests (CT/ MRIs/ X Rays)) taken.        PCPs want you to stay healthy.  Let us care for you.                   What Do I Do If I Wake Up Sick                                                     If you wake up sick, or you start to fill sick during the day, try these tips to get care and start to feel better soon.                                                                                                                              As soon as you start to feel bad, call your doctor's office and ask for same day or next day visit appointment.        If you cannot be seen with your doctor's office  within 24 hours, you can go to an urgent care and be seen.          How to stay well:     Take your medicine as ordered by your doctor      Fill your Medicine before you run out      Exercise       Enjoy walking in the sunlight daily  Keep your scheduled clinic appointments      Keep you scheduled yearly wellness visits            Budget-Friendly Healthy Eating    Save money at the grocery store and eat healthy.   Buy groceries keeping your budget in mind  Make a grocery list and only buy what you have on the list  Eat food you cook or have at home; limit fast food or eating out    Compare food labels  Look at store brand food labels and compare to brand names, often food value is the same and the store brand is cheaper      Look for products that do not have sugar, fat, or salt (sodium) added.  These often cost the same but are healthier for you.  They may be labeled as:  ?Sugar-free.  ?Nonfat.              ?Low-fat.  ?Sodium-free.  ?Low sodium.  Look for lean ground beef labeled as at least 92% lean and 8% fat.        Shopping    Buy only the items on your grocery list and go only to the areas of the store that have the items on your list.  Use coupons only for foods and brands you normally buy. Avoid buying items you wouldn't normally buy simply because they are on sale.  Check online and in newspapers for weekly deals.  Buy healthy items from the bulk bins when available, such as herbs, spices, flour, pasta, nuts, and dried fruit.  Buy fruits and vegetables that are in season. Prices are usually lower on in-season produce.  Look at the unit price on the price tag. Use it to compare different brands and sizes to find out which item is the best deal.  Choose healthy items that are often low-cost, such as carrots, potatoes, apples, bananas, and oranges. Dried or canned beans are a low-cost protein source.      Buy in bulk and freeze extra food. Items you can buy in bulk include meats, fish, poultry, frozen fruits,  "and frozen vegetables.  Avoid buying "ready-to-eat" foods, such as pre-cut fruits and vegetables and pre-made salads.  If possible, shop around to discover where you can find the best prices. Consider other retailers such as dollar stores, larger wholesale stores, local fruit and vegetable stands, and farmers markets.  Do not shop when you are hungry. If you shop while hungry, it may be hard to stick to your list and budget.      Resist impulse buying. Use your grocery list as your official plan for the week.      Buy a variety of vegetables and fruits by purchasing fresh, frozen, and canned items.  Look at the top and bottom shelves for deals. Foods at eye level (eye level of an adult or child) are usually more expensive.  Be efficient with your time when shopping. The more time you spend at the store, the more money you are likely to spend.  To save money when choosing more expensive foods like meats and dairy:  ?Choose cheaper cuts of meat, such as bone-in chicken thighs and drumsticks instead of skinless and boneless chicken. When you are ready to prepare the chicken, you can remove the skin yourself to make it healthier.  ?Choose lean meats like chicken or turkey instead of beef.  ?Choose canned seafood, such as tuna, salmon, or sardines.  ?Buy eggs as a low-cost source of protein.  ?Buy dried beans and peas, such as lentils, split peas, or kidney beans instead of meats. Dried beans and peas are a good alternative source of protein.  ?Buy the larger tubs of yogurt instead of individual-sized containers.                        Choose water instead of sodas and other sweetened beverages.  Avoid buying chips, cookies, and other "junk food." These items are usually expensive and not healthy.  Meal planning  Do not eat out or get fast food. Prepare food at home.  Make a grocery list and make sure to bring it with you to the store.   Plan meals and snacks according to a grocery list and budget you create.  Use " "leftovers in your meal plan for the week.  Look for recipes where you can cook once and make enough food for two meals.  Include budget-friendly meals like stews, casseroles, and stir-torres dishes.  Try some meatless meals or try "no cook" meals like salads.  Make sure that half your plate is filled with fruits or vegetables. Choose from fresh, frozen, or canned fruits and vegetables. If eating canned, remember to rinse them before eating. This will remove any excess salt added for packaging.            Summary  Eating healthy on a budget is possible if you plan your meals according to your budget, buy according to your budget and grocery list, and prepare food yourself.   Tips for buying more food on a limited budget include buying generic brands, using coupons only for foods you normally buy, and buying healthy items from the bulk bins when available.  Tips for buying cheaper food to replace expensive food include choosing cheaper, lean cuts of meat, and buying dried beans and peas.    Discuss any question you have with your doctor.          Why is taking care of your mouth/teeth/gums important?     Your mouth is the opening to your body.  If not kept clean, it can let in sickness to the rest of your body.     Oral Health Care (Dentists)                 City:  Provider Address Phone Number Insurance Plan   [place]:  None available                    Keyona Hickman  [facility] Keyona CASON [phone]   ADULTS ONLY Medicaid:  HB & AETNA ONLY             [place]         [facility] ([facility]) [address] [phone]  DENTURES ONLY ADULT Medicaid:  HB & AETNA ONLY             [facility] [address] [facility] [phone] All Medicaid/Medicare             Alon Mendoza, [address]  [facility] Alon Farias [phone] Medicaid Children only 2 to 21             [facility] [address], [place] [phone] Medicare             [facility] [address], [place] [phone] Medicaid Children only 2 to 21             [facility] [address], [place] [phone] Medicaid Children only 2 to 21             [facility]  [address], [place] [phone] Medicaid Children only 5 yrs and younger:  lip/tongue tie             [facility] [address] [facility] [phone] Medicaid Children only 2 to 21             [facility] [address] [facility] [phone] All Medicaid/Medicare :  ADULTS             [address] [phone] All Medicaid/Medicare :  ADULTS             [facility] [address] [facility] [phone] Medicaid Children only 2 to 21             [facility]  [address] [place] [phone] Medicaid Children only 2 to 21             Dr. Carli Sanchez,  [facility] [phone] Medicare for Dentures Only             [facility] [address] [address], [place] [phone] All Medicaid/Medicare :   EXCEPT C; ADULTS             [address] [phone]  UHC, IHC, HB, Aetna/Medicare :  ADULTS     [facility]; [place]: [phone]  [facility]; [place]: [phone]

## 2023-01-23 NOTE — PROGRESS NOTES
Identity verified.  Pt states the abdominal improved.  Pt states she has filled and is taking the medications as prescribed.  Instructed not to drive while taking Norco.  Pt denies any questions about the medications prescribed or ED discharge instructions.  Pt has not made a PCP appt.  She states she has no PCP.  Educated on the benefits of having a PCP and offered to set up appt to establish PCP, pt accepted.  Educated on the benefits of eating a healthy diet, drinking plenty of water, oral cleanings every six months and when/where to get medical care.  Pt states she had a dental appt in the last year, but does not recall the provider's name.  Pt has a breast clinic appt 12/8/22 and is to clean breasts daily with soap and water and RTC 3 months.  Patient denies any SDOH barriers at this time. Stressed the importance of keeping appointments and instructed on the use of urgent care clinic for non emergent issues until PCP established.  Patient verbalized understanding to all instructions.   [phone] Spoke with Azra at [facility] and requested an appointment with a doctor to establish care. She verbalized understanding and states she will contact the patient.  [phone] Spoke with Afsaneh at [facility] to follow up on referral. She states she has left 2 messages for the patient in attempt to schedule an appointment. Will contact patient and have her call the ortho clinic.  [id number] Identity verified. Patient notified that [facility] has left 2 messages in attempt to schedule an appointment. Instructed to contact the ortho clinic at [phone] to schedule an appointment. Patient verbalized understanding.  1525 Received email from Azra Barragan with [facility] with an appointment on 2/15/23 1250 with Dr. Vern Ramos to establish care.    1/23/2023 0915 After visit summary with education mailed to patient.     Appointment Reminder 2/14/2023  Follow up  2/14/2023    Appointments   PCP Visit Upcoming :    Yes  Appointment Date/Time :     PCP Visit Upcoming Reason :  Establish Care  PCP Visit Within Year :   No  PCP Visit Within Year Reason:      PCP Visit One Year Date :     Follow-Up Specialist Appt Scheduled :  Yes  Type of Specialist :     Ortho  Breast Surgery 3/9/2023  Follow-Up Lab Appt Scheduled :    Follow-Up Date :      Follow-Up Radiology Appt Scheduled :    Radiology Orders :       Providers Patient Visited Last Year :   Dentist    [facility]  Breast Surgery Clinic

## 2023-02-14 ENCOUNTER — PATIENT OUTREACH (OUTPATIENT)
Dept: EMERGENCY MEDICINE | Facility: HOSPITAL | Age: 33
End: 2023-02-14
Payer: MEDICAID

## 2023-02-14 NOTE — PROGRESS NOTES
Sent text message to remind patient of appointment with [facility] 2/15/2023 1250.  Provided clinic location, entrance #1, and phone number, [phone], with instructions to reschedule appointment if she is unable to attend.

## 2023-02-15 NOTE — PROGRESS NOTES
Identity verified.  Pt states she is having yellow drainage from the left breast I & D site.  Instructed to contact the [facility] and inform them.  Pt missed her appt today to establish PCP.  She states she has voicemail and did not get the text that was sent.  Pt asked if she could get an appt reminder the day before and the day of the appt.  Will provide reminders.  Provided clinic phone number, [phone], for pt to reschedule.  Pt has not called to schedule ortho appt.  Provided phone number, [phone], to call [facility] and make an appt.  Pt states she thinks her pap smear was 2 years ago.  Instructed to ask PCP for a GYN referral.  Pt states she did not get the educational material mailed to her.  Address verified.  Will mail again.  Patient denies any SDOH barriers at this time.  Stressed the importance of keeping appointments and instructed on the use of urgent care clinic for non emergent issues until PCP established.  Patient verbalized understanding to all instructions.      Appointment Reminder 3/8/2023  Follow up 3/15/2023    Appointments   PCP Visit Upcoming :    Yes  Appointment Date/Time :   3/30/2023 1410  PCP Visit Upcoming Reason :  Establish care  PCP Visit Within Year :   No  PCP Visit Within Year Reason:      PCP Visit One Year Date :     Follow-Up Specialist Appt Scheduled :  Yes  Type of Specialist :     Breast Clinic 3/9/2023  Follow-Up Lab Appt Scheduled :    Follow-Up Date :      Follow-Up Radiology Appt Scheduled :    Radiology Orders :        Providers Patient Visited Last Year :   Dentist    [facility]  Breast Surgery Clinic

## 2023-02-15 NOTE — PATIENT INSTRUCTIONS
If you have any questions call Susanna [phone].         Why Should I Have My Own Doctor or Nurse Practitioner (PCP) to Take Care of Me  What is a PCP (Primary Care Provider)?    A primary care provider is a doctor or nurse practitioner who you can call for an appointment and will see you when you are sick.    You will also be seen at scheduled appointment times during the year to check on your diabetes, or high blood pressure, or heart disease.    Why see the same PCP (doctor/nurse practitioner)?    You can be seen faster when you are sick           You, the PCP (doctor/nurse practitioner) and the office staff get to know each other; you begin to trust them to care for you. You take part in your health choices.   All of you together are a team.    Your medicine is looked at every time you visit, to be sure you are taking the medicine, as the PCP (doctor/nurse practitioner) ordered.    Your PCP (doctor/nurse practitioner) and their staff help keep you healthy and out of the hospital.  They can catch sicknesses earlier by ordering tests once a year to stop or prevent the sickness from getting worse.      Your PCP (doctor/nurse practitioner) can send you to providers who specialize (heart/bone/lung) if you need.  They and their office staff help keep track of your seeing other providers (doctors/nurse practitioners) and tests (CT/ MRIs/ X Rays)) taken.        PCPs want you to stay healthy.  Let us care for you.                     What Do I Do If I Wake Up Sick                                                     If you wake up sick, or you start to fill sick during the day, try these tips to get care and start to feel better soon.                                                                                                                              As soon as you start to feel bad, call your doctor's office and ask for same day or next day visit appointment.        If you cannot be seen with your doctor's  office within 24 hours, you can go to an urgent care and be seen.          How to stay well:     Take your medicine as ordered by your doctor      Fill your Medicine before you run out      Exercise       Enjoy walking in the sunlight daily  Keep your scheduled clinic appointments      Keep you scheduled yearly wellness visits              Budget-Friendly Healthy Eating    Save money at the grocery store and eat healthy.   Buy groceries keeping your budget in mind  Make a grocery list and only buy what you have on the list  Eat food you cook or have at home; limit fast food or eating out    Compare food labels  Look at store brand food labels and compare to brand names, often food value is the same and the store brand is cheaper      Look for products that do not have sugar, fat, or salt (sodium) added.  These often cost the same but are healthier for you.  They may be labeled as:  ?Sugar-free.  ?Nonfat.              ?Low-fat.  ?Sodium-free.  ?Low sodium.  Look for lean ground beef labeled as at least 92% lean and 8% fat.        Shopping    Buy only the items on your grocery list and go only to the areas of the store that have the items on your list.  Use coupons only for foods and brands you normally buy. Avoid buying items you wouldn't normally buy simply because they are on sale.  Check online and in newspapers for weekly deals.  Buy healthy items from the bulk bins when available, such as herbs, spices, flour, pasta, nuts, and dried fruit.  Buy fruits and vegetables that are in season. Prices are usually lower on in-season produce.  Look at the unit price on the price tag. Use it to compare different brands and sizes to find out which item is the best deal.  Choose healthy items that are often low-cost, such as carrots, potatoes, apples, bananas, and oranges. Dried or canned beans are a low-cost protein source.      Buy in bulk and freeze extra food. Items you can buy in bulk include meats, fish, poultry, frozen  "fruits, and frozen vegetables.  Avoid buying "ready-to-eat" foods, such as pre-cut fruits and vegetables and pre-made salads.  If possible, shop around to discover where you can find the best prices. Consider other retailers such as dollar stores, larger wholesale stores, local fruit and vegetable stands, and farmers markets.  Do not shop when you are hungry. If you shop while hungry, it may be hard to stick to your list and budget.      Resist impulse buying. Use your grocery list as your official plan for the week.      Buy a variety of vegetables and fruits by purchasing fresh, frozen, and canned items.  Look at the top and bottom shelves for deals. Foods at eye level (eye level of an adult or child) are usually more expensive.  Be efficient with your time when shopping. The more time you spend at the store, the more money you are likely to spend.  To save money when choosing more expensive foods like meats and dairy:  ?Choose cheaper cuts of meat, such as bone-in chicken thighs and drumsticks instead of skinless and boneless chicken. When you are ready to prepare the chicken, you can remove the skin yourself to make it healthier.  ?Choose lean meats like chicken or turkey instead of beef.  ?Choose canned seafood, such as tuna, salmon, or sardines.  ?Buy eggs as a low-cost source of protein.  ?Buy dried beans and peas, such as lentils, split peas, or kidney beans instead of meats. Dried beans and peas are a good alternative source of protein.  ?Buy the larger tubs of yogurt instead of individual-sized containers.                        Choose water instead of sodas and other sweetened beverages.  Avoid buying chips, cookies, and other "junk food." These items are usually expensive and not healthy.  Meal planning  Do not eat out or get fast food. Prepare food at home.  Make a grocery list and make sure to bring it with you to the store.   Plan meals and snacks according to a grocery list and budget you " "create.  Use leftovers in your meal plan for the week.  Look for recipes where you can cook once and make enough food for two meals.  Include budget-friendly meals like stews, casseroles, and stir-torres dishes.  Try some meatless meals or try "no cook" meals like salads.  Make sure that half your plate is filled with fruits or vegetables. Choose from fresh, frozen, or canned fruits and vegetables. If eating canned, remember to rinse them before eating. This will remove any excess salt added for packaging.            Summary  Eating healthy on a budget is possible if you plan your meals according to your budget, buy according to your budget and grocery list, and prepare food yourself.   Tips for buying more food on a limited budget include buying generic brands, using coupons only for foods you normally buy, and buying healthy items from the bulk bins when available.  Tips for buying cheaper food to replace expensive food include choosing cheaper, lean cuts of meat, and buying dried beans and peas.    Discuss any question you have with your doctor.              Why is taking care of your mouth/teeth/gums important?     Your mouth is the opening to your body.  If not kept clean, it can let in sickness to the rest of your body.     Oral Health Care (Dentists)                 City:  Provider Address Phone Number Insurance Plan   [place]:  None available                    [place]ona Hickman, PETER [address] [facility] Keyona CASON [phone]   ADULTS ONLY Medicaid:  HB & AETNA ONLY             [place]         Dentures [facility] Dental Service ([facility]) [address] [phone]  DENTURES ONLY ADULT Medicaid:  HB & AETNA ONLY             [facility] [address] [facility] [phone] All Medicaid/Medicare             Alon Mendoza, PETER [address]  [facility] Alon Farias [phone] Medicaid Children only 2 to 21             [place]  "        [facility] [address], [place] [phone] Medicare             [facility] [address], [place] [phone] Medicaid Children only 2 to 21             [facility] [address], [place] [phone] Medicaid Children only 2 to 21             [place] [facility]  [address], [place] [phone] Medicaid Children only 5 yrs and younger:  lip/tongue tie             [facility] [address] [facility] [phone] Medicaid Children only 2 to 21             [facility] [address] [facility] [phone] All Medicaid/Medicare :  ADULTS             [address] [phone] All Medicaid/Medicare :  ADULTS             [facility] [address] [place] [facility] [phone] Medicaid Children only 2 to 21             [facility]  [address] [place] [phone] Medicaid Children only 2 to 21             Dr. Carli Sanchez,  Formerly named [facility] [phone] Medicare for Dentures Only             [facility], [facility] [address] [phone] All Medicaid/Medicare :   EXCEPT [facility]; ADULTS             [address] [phone]  UHC, IHC, HB, Aetna/Medicare :  ADULTS     [facility]; [place]: [phone]  [facility]; [place]: [phone]     [address]  [phone]

## 2023-03-08 ENCOUNTER — PATIENT OUTREACH (OUTPATIENT)
Dept: EMERGENCY MEDICINE | Facility: HOSPITAL | Age: 33
End: 2023-03-08
Payer: MEDICAID

## 2023-03-08 NOTE — PROGRESS NOTES
Identity verified.  Reminded patient of appointment with [facility] 3/9/2023 1220.  Patient verbalized understanding.  Pt states she did not contact [facility] for an appt.  Offered to make the appointment with [facility], pt accepted.  [id number] Spoke with Afsaneh at [facility] and obtained an appointment for 5/2/2023 1020. Afsaneh states the clinic attempted to reach the patient 1/12, 1/20, 1/24/23 without success.  0916 Identity verified. Patient notified of [facility] appointment 5/2/2023 1020. She verbalized understanding.    Follow up 3/15/2023

## 2023-03-15 ENCOUNTER — PATIENT OUTREACH (OUTPATIENT)
Dept: EMERGENCY MEDICINE | Facility: HOSPITAL | Age: 33
End: 2023-03-15
Payer: MEDICAID

## 2023-03-15 NOTE — PROGRESS NOTES
Left voicemail reminding patient of appointment with [facility] 3/16/2023 1340.    Appointment Reminder 3/29/2023

## 2023-03-16 ENCOUNTER — PATIENT OUTREACH (OUTPATIENT)
Dept: EMERGENCY MEDICINE | Facility: HOSPITAL | Age: 33
End: 2023-03-16
Payer: MEDICAID

## 2023-03-17 NOTE — PROGRESS NOTES
Left voicemail reminding patient of appointment with [facility] today, 3/16/2023 1340.    Appointment follow up 3/29/2023

## 2023-04-21 ENCOUNTER — TELEPHONE (OUTPATIENT)
Dept: SURGERY | Facility: CLINIC | Age: 33
End: 2023-04-21
Payer: MEDICAID

## 2023-05-02 ENCOUNTER — OFFICE VISIT (OUTPATIENT)
Dept: ORTHOPEDICS | Facility: CLINIC | Age: 33
End: 2023-05-02
Payer: MEDICAID

## 2023-05-02 ENCOUNTER — HOSPITAL ENCOUNTER (OUTPATIENT)
Dept: RADIOLOGY | Facility: HOSPITAL | Age: 33
Discharge: HOME | End: 2023-05-02
Attending: NURSE PRACTITIONER
Payer: MEDICAID

## 2023-05-02 VITALS — WEIGHT: 293 LBS | BODY MASS INDEX: 48.82 KG/M2 | HEIGHT: 65 IN

## 2023-05-02 DIAGNOSIS — M22.01 RECURRENT DISLOCATION OF RIGHT PATELLA: Primary | ICD-10-CM

## 2023-05-02 DIAGNOSIS — M23.91 INTERNAL DERANGEMENT OF RIGHT KNEE: ICD-10-CM

## 2023-05-02 DIAGNOSIS — R52 PAIN: ICD-10-CM

## 2023-05-02 PROCEDURE — 1160F PR REVIEW ALL MEDS BY PRESCRIBER/CLIN PHARMACIST DOCUMENTED: ICD-10-PCS | Mod: CPTII,,, | Performed by: NURSE PRACTITIONER

## 2023-05-02 PROCEDURE — 3008F BODY MASS INDEX DOCD: CPT | Mod: CPTII,,, | Performed by: NURSE PRACTITIONER

## 2023-05-02 PROCEDURE — 73564 X-RAY EXAM KNEE 4 OR MORE: CPT | Mod: TC,RT

## 2023-05-02 PROCEDURE — 99215 OFFICE O/P EST HI 40 MIN: CPT | Mod: S$PBB,,, | Performed by: NURSE PRACTITIONER

## 2023-05-02 PROCEDURE — 99213 OFFICE O/P EST LOW 20 MIN: CPT | Mod: PBBFAC | Performed by: NURSE PRACTITIONER

## 2023-05-02 PROCEDURE — 1159F MED LIST DOCD IN RCRD: CPT | Mod: CPTII,,, | Performed by: NURSE PRACTITIONER

## 2023-05-02 PROCEDURE — 99215 PR OFFICE/OUTPT VISIT, EST, LEVL V, 40-54 MIN: ICD-10-PCS | Mod: S$PBB,,, | Performed by: NURSE PRACTITIONER

## 2023-05-02 PROCEDURE — 1159F PR MEDICATION LIST DOCUMENTED IN MEDICAL RECORD: ICD-10-PCS | Mod: CPTII,,, | Performed by: NURSE PRACTITIONER

## 2023-05-02 PROCEDURE — 1160F RVW MEDS BY RX/DR IN RCRD: CPT | Mod: CPTII,,, | Performed by: NURSE PRACTITIONER

## 2023-05-02 PROCEDURE — 3008F PR BODY MASS INDEX (BMI) DOCUMENTED: ICD-10-PCS | Mod: CPTII,,, | Performed by: NURSE PRACTITIONER

## 2023-05-02 RX ORDER — DICLOFENAC SODIUM 10 MG/G
4 GEL TOPICAL 3 TIMES DAILY
Qty: 100 G | Refills: 2 | OUTPATIENT
Start: 2023-05-02 | End: 2024-01-12
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
## 2023-08-07 ENCOUNTER — OFFICE VISIT (OUTPATIENT)
Dept: ORTHOPEDICS | Facility: CLINIC | Age: 33
End: 2023-08-07
Payer: MEDICAID

## 2023-08-07 VITALS — HEIGHT: 65 IN | WEIGHT: 280 LBS | BODY MASS INDEX: 46.65 KG/M2

## 2023-08-07 DIAGNOSIS — M22.01 RECURRENT DISLOCATION OF RIGHT PATELLA: Primary | ICD-10-CM

## 2023-08-07 DIAGNOSIS — M23.91 INTERNAL DERANGEMENT OF RIGHT KNEE: ICD-10-CM

## 2023-08-07 PROCEDURE — 1160F PR REVIEW ALL MEDS BY PRESCRIBER/CLIN PHARMACIST DOCUMENTED: ICD-10-PCS | Mod: CPTII,95,, | Performed by: NURSE PRACTITIONER

## 2023-08-07 PROCEDURE — 1160F RVW MEDS BY RX/DR IN RCRD: CPT | Mod: CPTII,95,, | Performed by: NURSE PRACTITIONER

## 2023-08-07 PROCEDURE — 99214 OFFICE O/P EST MOD 30 MIN: CPT | Mod: 95,,, | Performed by: NURSE PRACTITIONER

## 2023-08-07 PROCEDURE — 3008F PR BODY MASS INDEX (BMI) DOCUMENTED: ICD-10-PCS | Mod: CPTII,95,, | Performed by: NURSE PRACTITIONER

## 2023-08-07 PROCEDURE — 1159F PR MEDICATION LIST DOCUMENTED IN MEDICAL RECORD: ICD-10-PCS | Mod: CPTII,95,, | Performed by: NURSE PRACTITIONER

## 2023-08-07 PROCEDURE — 3008F BODY MASS INDEX DOCD: CPT | Mod: CPTII,95,, | Performed by: NURSE PRACTITIONER

## 2023-08-07 PROCEDURE — 99214 PR OFFICE/OUTPT VISIT, EST, LEVL IV, 30-39 MIN: ICD-10-PCS | Mod: 95,,, | Performed by: NURSE PRACTITIONER

## 2023-08-07 PROCEDURE — 1159F MED LIST DOCD IN RCRD: CPT | Mod: CPTII,95,, | Performed by: NURSE PRACTITIONER
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
## 2023-11-06 ENCOUNTER — OFFICE VISIT (OUTPATIENT)
Dept: ORTHOPEDICS | Facility: CLINIC | Age: 33
End: 2023-11-06
Payer: MEDICAID

## 2023-11-06 VITALS — HEIGHT: 65 IN | WEIGHT: 280 LBS | BODY MASS INDEX: 46.65 KG/M2

## 2023-11-06 DIAGNOSIS — M23.91 INTERNAL DERANGEMENT OF RIGHT KNEE: ICD-10-CM

## 2023-11-06 DIAGNOSIS — M22.01 RECURRENT DISLOCATION OF RIGHT PATELLA: Primary | ICD-10-CM

## 2023-11-06 PROCEDURE — 99443 PR PHYSICIAN TELEPHONE EVALUATION 21-30 MIN: ICD-10-PCS | Mod: 95,,, | Performed by: NURSE PRACTITIONER

## 2023-11-06 PROCEDURE — 1159F MED LIST DOCD IN RCRD: CPT | Mod: CPTII,95,, | Performed by: NURSE PRACTITIONER

## 2023-11-06 PROCEDURE — 1160F PR REVIEW ALL MEDS BY PRESCRIBER/CLIN PHARMACIST DOCUMENTED: ICD-10-PCS | Mod: CPTII,95,, | Performed by: NURSE PRACTITIONER

## 2023-11-06 PROCEDURE — 1159F PR MEDICATION LIST DOCUMENTED IN MEDICAL RECORD: ICD-10-PCS | Mod: CPTII,95,, | Performed by: NURSE PRACTITIONER

## 2023-11-06 PROCEDURE — 1160F RVW MEDS BY RX/DR IN RCRD: CPT | Mod: CPTII,95,, | Performed by: NURSE PRACTITIONER

## 2023-11-06 PROCEDURE — 99443 PR PHYSICIAN TELEPHONE EVALUATION 21-30 MIN: CPT | Mod: 95,,, | Performed by: NURSE PRACTITIONER
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
## 2023-12-13 ENCOUNTER — OFFICE VISIT (OUTPATIENT)
Dept: URGENT CARE | Facility: CLINIC | Age: 33
End: 2023-12-13
Payer: MEDICAID

## 2023-12-13 VITALS
WEIGHT: 293 LBS | RESPIRATION RATE: 16 BRPM | TEMPERATURE: 99 F | HEART RATE: 96 BPM | HEIGHT: 64 IN | SYSTOLIC BLOOD PRESSURE: 146 MMHG | DIASTOLIC BLOOD PRESSURE: 88 MMHG | BODY MASS INDEX: 50.02 KG/M2

## 2023-12-13 DIAGNOSIS — B00.1 COLD SORE: Primary | ICD-10-CM

## 2023-12-13 DIAGNOSIS — K13.0 LIP PAIN: ICD-10-CM

## 2023-12-13 PROCEDURE — 99213 OFFICE O/P EST LOW 20 MIN: CPT | Mod: S$PBB,,, | Performed by: NURSE PRACTITIONER

## 2023-12-13 PROCEDURE — 99213 PR OFFICE/OUTPT VISIT, EST, LEVL III, 20-29 MIN: ICD-10-PCS | Mod: S$PBB,,, | Performed by: NURSE PRACTITIONER

## 2023-12-13 PROCEDURE — 99213 OFFICE O/P EST LOW 20 MIN: CPT | Mod: PBBFAC | Performed by: NURSE PRACTITIONER

## 2023-12-13 RX ORDER — VALACYCLOVIR HYDROCHLORIDE 1 G/1
2000 TABLET, FILM COATED ORAL EVERY 12 HOURS
Qty: 4 TABLET | Refills: 0 | Status: SHIPPED | OUTPATIENT
Start: 2023-12-13 | End: 2023-12-14

## 2023-12-14 NOTE — PROGRESS NOTES
"Subjective:       Patient ID: Taylor Crain is a 33 y.o. female.    Vitals:  height is 5' 4" (1.626 m) and weight is 134.2 kg (295 lb 14.4 oz). Her oral temperature is 98.6 °F (37 °C). Her blood pressure is 146/88 (abnormal) and her pulse is 96. Her respiration is 16.     Chief Complaint:  lip burning (Pt reports woke up this am, Rt corner of upper lip, burning & tingling sensation, (clear bumps).)    Patient states she has a history of cold sores, woke up this morning and noticed a bump to her right upper lip.        Constitution: Negative.   Skin:         Cold sore R upper lip   Neurological: Negative.        Objective:      Physical Exam   Constitutional: She is oriented to person, place, and time. She appears well-developed.   HENT:   Head: Normocephalic.       Eyes: Conjunctivae and EOM are normal. Pupils are equal, round, and reactive to light.   Neck: Neck supple.   Musculoskeletal: Normal range of motion.         General: Normal range of motion.   Neurological: She is alert and oriented to person, place, and time.   Skin: Skin is warm and dry. Capillary refill takes less than 2 seconds.   Psychiatric: Her behavior is normal.   Vitals reviewed.        Assessment:       1. Cold sore    2. Lip pain              No results found.   Plan:       Medication as ordered. Follow up with pcp for any ongoing symptoms.   Cold sore  -     valACYclovir (VALTREX) 1000 MG tablet; Take 2 tablets (2,000 mg total) by mouth every 12 (twelve) hours. for 1 day  Dispense: 4 tablet; Refill: 0    Lip pain                     "

## 2024-01-12 ENCOUNTER — HOSPITAL ENCOUNTER (EMERGENCY)
Facility: HOSPITAL | Age: 34
Discharge: HOME | End: 2024-01-12
Attending: INTERNAL MEDICINE
Payer: MEDICAID

## 2024-01-12 VITALS
OXYGEN SATURATION: 100 % | DIASTOLIC BLOOD PRESSURE: 90 MMHG | HEIGHT: 64 IN | BODY MASS INDEX: 49.51 KG/M2 | TEMPERATURE: 98 F | WEIGHT: 290 LBS | SYSTOLIC BLOOD PRESSURE: 149 MMHG | RESPIRATION RATE: 20 BRPM | HEART RATE: 87 BPM

## 2024-01-12 DIAGNOSIS — M54.9 MUSCULOSKELETAL BACK PAIN: Primary | ICD-10-CM

## 2024-01-12 LAB
APPEARANCE UR: ABNORMAL
B-HCG SERPL QL: NEGATIVE
BILIRUB UR QL STRIP.AUTO: NEGATIVE
COLOR UR AUTO: YELLOW
GLUCOSE UR QL STRIP.AUTO: NEGATIVE
KETONES UR QL STRIP.AUTO: NEGATIVE
LEUKOCYTE ESTERASE UR QL STRIP.AUTO: NEGATIVE
NITRITE UR QL STRIP.AUTO: NEGATIVE
PH UR STRIP.AUTO: 6.5 [PH]
PROT UR QL STRIP.AUTO: NEGATIVE
RBC UR QL AUTO: NEGATIVE
SP GR UR STRIP.AUTO: 1.02 (ref 1–1.03)
UROBILINOGEN UR STRIP-ACNC: 0.2

## 2024-01-12 PROCEDURE — 63600175 PHARM REV CODE 636 W HCPCS: Performed by: PHYSICIAN ASSISTANT

## 2024-01-12 PROCEDURE — 81025 URINE PREGNANCY TEST: CPT | Performed by: PHYSICIAN ASSISTANT

## 2024-01-12 PROCEDURE — 25000003 PHARM REV CODE 250: Performed by: PHYSICIAN ASSISTANT

## 2024-01-12 PROCEDURE — 81003 URINALYSIS AUTO W/O SCOPE: CPT | Performed by: PHYSICIAN ASSISTANT

## 2024-01-12 PROCEDURE — 96372 THER/PROPH/DIAG INJ SC/IM: CPT | Performed by: PHYSICIAN ASSISTANT

## 2024-01-12 PROCEDURE — 99284 EMERGENCY DEPT VISIT MOD MDM: CPT

## 2024-01-12 RX ORDER — DICLOFENAC SODIUM 50 MG/1
50 TABLET, DELAYED RELEASE ORAL 3 TIMES DAILY
Qty: 21 TABLET | Refills: 0 | OUTPATIENT
Start: 2024-01-12 | End: 2024-05-30

## 2024-01-12 RX ORDER — METHOCARBAMOL 500 MG/1
1000 TABLET, FILM COATED ORAL
Status: COMPLETED | OUTPATIENT
Start: 2024-01-12 | End: 2024-01-12

## 2024-01-12 RX ORDER — KETOROLAC TROMETHAMINE 30 MG/ML
60 INJECTION, SOLUTION INTRAMUSCULAR; INTRAVENOUS
Status: COMPLETED | OUTPATIENT
Start: 2024-01-12 | End: 2024-01-12

## 2024-01-12 RX ORDER — METHOCARBAMOL 750 MG/1
1500 TABLET, FILM COATED ORAL 3 TIMES DAILY
Qty: 42 TABLET | Refills: 0 | Status: SHIPPED | OUTPATIENT
Start: 2024-01-12 | End: 2024-01-19

## 2024-01-12 RX ADMIN — KETOROLAC TROMETHAMINE 60 MG: 30 INJECTION, SOLUTION INTRAMUSCULAR at 07:01

## 2024-01-12 RX ADMIN — METHOCARBAMOL 1000 MG: 500 TABLET ORAL at 07:01

## 2024-01-12 NOTE — Clinical Note
Silvana Santillan accompanied their spouse to the emergency department on 1/12/2024. They may return to work on 01/13/2024.      If you have any questions or concerns, please don't hesitate to call.      Shanel NAGY Heme/Onc

## 2024-01-12 NOTE — Clinical Note
Silvana Santillan accompanied their spouse to the emergency department on 1/12/2024. They may return to work on 01/13/2024.      If you have any questions or concerns, please don't hesitate to call.      Shanel NAGY

## 2024-01-13 NOTE — DISCHARGE INSTRUCTIONS
You have been prescribed Diclofenac for pain. This is an Non-Steroidal Anti-Inflammatory (NSAID) Medication. Please do not take any additional NSAIDs while you are taking this medication including (Advil, Aleve, Motrin, Ibuprofen, Mobic\meloxicam, Naprosyn, Toradol, ketoralac, etc.). Please stop taking this medication if you experience: weakness, itching, yellow skin or eyes, joint pains, vomiting blood, blood or black stools, unusual weight gain, or swelling in your arms, legs, hands, or feet.     You have been prescribed Robaxin (Methocarbamol) for muscle spasms/pain. Please do not take this medication while working, drinking alcohol, swimming, or while driving/operating heavy machinery. This medication may cause drowsiness, dizziness, impair judgment, and reduce physical capabilities.You should not drive, operate heavy machinery, or make life changing decisions while taking this medication.

## 2024-01-13 NOTE — ED PROVIDER NOTES
Encounter Date: 2024       History     Chief Complaint   Patient presents with    Back Pain     Pt c/o low back pain onset four months ago, denies injury.     33-year-old female presents to ED for evaluation of low back pain for the past 4 months.  Reports that she gets intermittent back pain worse with movement.  Reports sometimes it pain will radiate around into her stomach.  Reports that pain does this when she bends over.  Denies any trauma or injury.  Denies any heavy lifting.  Patient states she has not followed up with any physician as she does not have a primary care doctor.    The history is provided by the patient. No  was used.     Review of patient's allergies indicates:  No Known Allergies  Past Medical History:   Diagnosis Date    Morbid obesity      Past Surgical History:   Procedure Laterality Date    BREAST SURGERY       SECTION      DILATION AND CURETTAGE OF UTERUS      DILATION AND CURETTAGE OF UTERUS      US ASPIRATION ABSCESS HEMATOMA SEROMA (BREAST IMAGING)       Family History   Problem Relation Age of Onset    Diabetes Mother     Cancer Father      Social History     Tobacco Use    Smoking status: Some Days     Current packs/day: 0.50     Types: Cigarettes    Smokeless tobacco: Never   Substance Use Topics    Alcohol use: Not Currently     Comment: social    Drug use: Yes     Types: Marijuana     Comment: social     Review of Systems   Constitutional:  Negative for chills, fatigue and fever.   Respiratory:  Negative for shortness of breath.    Cardiovascular:  Negative for chest pain.   Gastrointestinal:  Negative for abdominal pain, diarrhea, nausea and vomiting.   Genitourinary:  Negative for dysuria, flank pain, frequency and urgency.   Musculoskeletal:  Positive for back pain and myalgias.   All other systems reviewed and are negative.      Physical Exam     Initial Vitals [24 1736]   BP Pulse Resp Temp SpO2   (!) 149/90 87 20 97.7 °F (36.5 °C) 100 %       MAP       --         Physical Exam    Vitals reviewed.  Constitutional: She appears well-developed.   HENT:   Head: Normocephalic and atraumatic.   Right Ear: Tympanic membrane and external ear normal.   Left Ear: Tympanic membrane and external ear normal.   Mouth/Throat: Uvula is midline, oropharynx is clear and moist and mucous membranes are normal. No trismus in the jaw. No uvula swelling. No oropharyngeal exudate, posterior oropharyngeal edema or posterior oropharyngeal erythema.   Eyes: Conjunctivae are normal. Pupils are equal, round, and reactive to light.   Neck: Neck supple.   Normal range of motion.  Cardiovascular:  Normal rate, regular rhythm and normal heart sounds.           Pulmonary/Chest: Breath sounds normal. She has no wheezes. She has no rhonchi. She has no rales.   Abdominal: Abdomen is soft. Bowel sounds are normal. There is no abdominal tenderness. There is no rebound and no guarding.   Musculoskeletal:         General: Normal range of motion.      Cervical back: Normal, normal range of motion and neck supple.      Thoracic back: Normal.      Lumbar back: Spasms and tenderness present. No swelling, deformity, signs of trauma or bony tenderness. Normal range of motion.        Back:      Neurological: She is alert and oriented to person, place, and time. She has normal strength. No cranial nerve deficit or sensory deficit. GCS score is 15. GCS eye subscore is 4. GCS verbal subscore is 5. GCS motor subscore is 6.   Skin: Skin is warm and dry.   Psychiatric: She has a normal mood and affect.         ED Course   Procedures  Labs Reviewed   URINALYSIS, REFLEX TO URINE CULTURE - Abnormal; Notable for the following components:       Result Value    Appearance, UA Hazy (*)     All other components within normal limits   PREGNANCY TEST, URINE RAPID - Normal          Imaging Results    None          Medications   ketorolac injection 60 mg (60 mg Intramuscular Given 1/12/24 1911)   methocarbamoL  tablet 1,000 mg (1,000 mg Oral Given 1/12/24 1912)     Medical Decision Making  33-year-old female presents to ED for evaluation of low back pain for the past 4 months.  Reports that she gets intermittent back pain worse with movement.  Reports sometimes it pain will radiate around into her stomach.  Reports that pain does this when she bends over.  Denies any trauma or injury.  Denies any heavy lifting.  Patient states she has not followed up with any physician as she does not have a primary care doctor.    Differential Diagnosis includes, but is not limited to:  Cauda equina syndrome, diskitis/osteomyelitis, epidural/paraspinal abscess, AAA, aortic dissection, post-op/hardware infection, trauma/vertebral fracture, spinal cord injury, disc herniation, spinal stenosis, sciatica, radiculopathy, neoplasm, lumbar muscle strain, muscle spasm, neuropathic pain, UTI/pyelonephritis, nephrolithiasis.      Amount and/or Complexity of Data Reviewed  Discussion of management or test interpretation with external provider(s): Patient presents to ED for evaluation of back pain worsening over the last 3-4 months.  Denies any trauma or injury.  No indication for x-ray at this time.  UA obtained without any signs of blood or bacteria for infection or possible stone.  Patient's pain consistent with musculoskeletal back pain.  Will treat symptomatically.  Will give referral to PCP for follow-up.  Return ED precautions given.  Patient verbalizes understanding and agrees with plan of care.    Risk  OTC drugs.  Prescription drug management.                                      Clinical Impression:  Final diagnoses:  [M54.9] Musculoskeletal back pain (Primary)          ED Disposition Condition    Discharge Stable          ED Prescriptions       Medication Sig Dispense Start Date End Date Auth. Provider    methocarbamoL (ROBAXIN) 750 MG Tab Take 2 tablets (1,500 mg total) by mouth 3 (three) times daily. for 7 days 42 tablet 1/12/2024  1/19/2024 Aditi Barrientos PA    diclofenac (VOLTAREN) 50 MG EC tablet Take 1 tablet (50 mg total) by mouth 3 (three) times daily. for 7 days 21 tablet 1/12/2024 1/19/2024 Aditi Barrientos PA          Follow-up Information       Follow up With Specialties Details Why Contact Info    PCP  In 1 week As needed, If you do not have a PCP you may call [phone] to help get set up If you do not have a PCP you may call [phone] to help get set up.             Aditi Barrientos PA  01/12/24 1957

## 2024-02-03 NOTE — ED PROVIDER NOTES
Encounter Date: 2024       History     Chief Complaint   Patient presents with    Back Pain     Pt c/o low back pain onset four months ago, denies injury.     33-year-old female complains of a 4 month history of low back pain.  She denies any significant trauma and states there has no radiation of the pain.  There is no tingling numbness nor weakness.  No bowel or urinary incontinence no retention      Review of patient's allergies indicates:  No Known Allergies  Past Medical History:   Diagnosis Date    Morbid obesity      Past Surgical History:   Procedure Laterality Date    BREAST SURGERY       SECTION      DILATION AND CURETTAGE OF UTERUS      DILATION AND CURETTAGE OF UTERUS      US ASPIRATION ABSCESS HEMATOMA SEROMA (BREAST IMAGING)       Family History   Problem Relation Age of Onset    Diabetes Mother     Cancer Father      Social History     Tobacco Use    Smoking status: Some Days     Current packs/day: 0.50     Types: Cigarettes    Smokeless tobacco: Never   Substance Use Topics    Alcohol use: Not Currently     Comment: social    Drug use: Yes     Types: Marijuana     Comment: social     Review of Systems   All other systems reviewed and are negative.      Physical Exam     Initial Vitals [24 1736]   BP Pulse Resp Temp SpO2   (!) 149/90 87 20 97.7 °F (36.5 °C) 100 %      MAP       --         Physical Exam    Nursing note and vitals reviewed.  Constitutional: She appears well-developed. No distress.   HENT:   Mouth/Throat: Oropharynx is clear and moist.   Eyes: Conjunctivae are normal.   Cardiovascular:  Normal rate, regular rhythm and normal heart sounds.     Exam reveals no gallop and no friction rub.       No murmur heard.  Pulmonary/Chest: Breath sounds normal. No respiratory distress. She has no wheezes. She has no rhonchi. She has no rales.   Abdominal: Abdomen is soft. Bowel sounds are normal. She exhibits no distension. There is no abdominal tenderness. There is no guarding.    Musculoskeletal:         General: No edema.      Comments: No spine tenderness.  No visible evidence of trauma.  No swelling bruising nor other abnormality noted     Lymphadenopathy:     She has no cervical adenopathy.   Neurological: She is alert. She displays a negative Romberg sign. Coordination and gait normal. GCS eye subscore is 4. GCS verbal subscore is 5. GCS motor subscore is 6.   Skin: Skin is warm.   Psychiatric: She has a normal mood and affect.         ED Course   Procedures  Labs Reviewed   URINALYSIS, REFLEX TO URINE CULTURE - Abnormal; Notable for the following components:       Result Value    Appearance, UA Hazy (*)     All other components within normal limits   PREGNANCY TEST, URINE RAPID - Normal          Imaging Results    None          Medications   ketorolac injection 60 mg (60 mg Intramuscular Given 24)   methocarbamoL tablet 1,000 mg (1,000 mg Oral Given 24)     Medical Decision Making  Differential diagnosis:  Strain, sprain, fracture, dislocation, aortic aneurysm, aortic dissection    Amount and/or Complexity of Data Reviewed  External Data Reviewed: notes.     Details: In [facility]    Risk  Prescription drug management.                                      Clinical Impression:  Final diagnoses:  [M54.9] Musculoskeletal back pain (Primary)          ED Disposition Condition    Discharge Stable          ED Prescriptions       Medication Sig Dispense Start Date End Date Auth. Provider    methocarbamoL (ROBAXIN) 750 MG Tab () Take 2 tablets (1,500 mg total) by mouth 3 (three) times daily. for 7 days 42 tablet 2024 Aditi Barrientos PA    diclofenac (VOLTAREN) 50 MG EC tablet () Take 1 tablet (50 mg total) by mouth 3 (three) times daily. for 7 days 21 tablet 2024 Aditi Barrientos PA          Follow-up Information       Follow up With Specialties Details Why Contact Info    PCP  In 1 week As needed, If you do not have a PCP you may call  [phone] to help get set up If you do not have a PCP you may call [phone] to help get set up.             Bradly Alvarez Jr., MD  02/03/24 1521       Bradly Alvarez Jr., MD  02/29/24 1234       Bradly Alvarez Jr., MD  02/29/24 6140

## 2024-03-11 ENCOUNTER — HOSPITAL ENCOUNTER (EMERGENCY)
Facility: HOSPITAL | Age: 34
Discharge: HOME | End: 2024-03-12
Attending: STUDENT IN AN ORGANIZED HEALTH CARE EDUCATION/TRAINING PROGRAM
Payer: MEDICAID

## 2024-03-11 DIAGNOSIS — S39.012A STRAIN OF LUMBAR REGION, INITIAL ENCOUNTER: ICD-10-CM

## 2024-03-11 DIAGNOSIS — R09.81 NASAL CONGESTION: Primary | ICD-10-CM

## 2024-03-11 LAB
FLUAV AG UPPER RESP QL IA.RAPID: NOT DETECTED
FLUBV AG UPPER RESP QL IA.RAPID: NOT DETECTED
SARS-COV-2 RNA RESP QL NAA+PROBE: NOT DETECTED

## 2024-03-11 PROCEDURE — 99284 EMERGENCY DEPT VISIT MOD MDM: CPT

## 2024-03-11 PROCEDURE — 0240U COVID/FLU A&B PCR: CPT | Performed by: STUDENT IN AN ORGANIZED HEALTH CARE EDUCATION/TRAINING PROGRAM

## 2024-03-11 PROCEDURE — 81003 URINALYSIS AUTO W/O SCOPE: CPT | Performed by: PHYSICIAN ASSISTANT

## 2024-03-11 PROCEDURE — 81025 URINE PREGNANCY TEST: CPT | Performed by: PHYSICIAN ASSISTANT

## 2024-03-11 NOTE — Clinical Note
Silvana Santana accompanied their spouse to the emergency department on 3/11/2024. They may return to work on 03/14/2024.      If you have any questions or concerns, please don't hesitate to call.      Igor Maynard MD

## 2024-03-11 NOTE — Clinical Note
"Taylor Bunnviviana Crain was seen and treated in our emergency department on 3/11/2024.  She may return to work on 03/14/2024.       If you have any questions or concerns, please don't hesitate to call.      Igor Maynard MD"

## 2024-03-12 VITALS
DIASTOLIC BLOOD PRESSURE: 95 MMHG | HEIGHT: 64 IN | TEMPERATURE: 98 F | SYSTOLIC BLOOD PRESSURE: 126 MMHG | RESPIRATION RATE: 18 BRPM | OXYGEN SATURATION: 100 % | HEART RATE: 100 BPM | BODY MASS INDEX: 49.85 KG/M2 | WEIGHT: 292 LBS

## 2024-03-12 LAB
APPEARANCE UR: CLEAR
B-HCG SERPL QL: NEGATIVE
BACTERIA #/AREA URNS AUTO: ABNORMAL /HPF
BILIRUB UR QL STRIP.AUTO: NEGATIVE
COLOR UR AUTO: ABNORMAL
GLUCOSE UR QL STRIP.AUTO: NEGATIVE
KETONES UR QL STRIP.AUTO: ABNORMAL
LEUKOCYTE ESTERASE UR QL STRIP.AUTO: NEGATIVE
NITRITE UR QL STRIP.AUTO: POSITIVE
PH UR STRIP.AUTO: 5 [PH]
PROT UR QL STRIP.AUTO: 100
RBC #/AREA URNS AUTO: >=100 /HPF
RBC UR QL AUTO: ABNORMAL
SP GR UR STRIP.AUTO: >=1.03 (ref 1–1.03)
SQUAMOUS #/AREA URNS AUTO: ABNORMAL /HPF
UROBILINOGEN UR STRIP-ACNC: 1
WBC #/AREA URNS AUTO: ABNORMAL /HPF

## 2024-03-12 PROCEDURE — 63600175 PHARM REV CODE 636 W HCPCS: Performed by: STUDENT IN AN ORGANIZED HEALTH CARE EDUCATION/TRAINING PROGRAM

## 2024-03-12 PROCEDURE — 96372 THER/PROPH/DIAG INJ SC/IM: CPT | Performed by: STUDENT IN AN ORGANIZED HEALTH CARE EDUCATION/TRAINING PROGRAM

## 2024-03-12 RX ORDER — CYCLOBENZAPRINE HCL 5 MG
5 TABLET ORAL 3 TIMES DAILY PRN
Qty: 10 TABLET | Refills: 0 | Status: SHIPPED | OUTPATIENT
Start: 2024-03-12 | End: 2024-03-22

## 2024-03-12 RX ORDER — FLUTICASONE PROPIONATE 50 MCG
1 SPRAY, SUSPENSION (ML) NASAL DAILY
Qty: 15 G | Refills: 0 | Status: SHIPPED | OUTPATIENT
Start: 2024-03-12

## 2024-03-12 RX ORDER — KETOROLAC TROMETHAMINE 30 MG/ML
30 INJECTION, SOLUTION INTRAMUSCULAR; INTRAVENOUS
Status: COMPLETED | OUTPATIENT
Start: 2024-03-12 | End: 2024-03-12

## 2024-03-12 RX ORDER — KETOROLAC TROMETHAMINE 10 MG/1
10 TABLET, FILM COATED ORAL EVERY 6 HOURS PRN
Qty: 20 TABLET | Refills: 0 | Status: SHIPPED | OUTPATIENT
Start: 2024-03-12 | End: 2024-03-17

## 2024-03-12 RX ADMIN — KETOROLAC TROMETHAMINE 30 MG: 30 INJECTION, SOLUTION INTRAMUSCULAR; INTRAVENOUS at 12:03

## 2024-03-12 NOTE — ED TRIAGE NOTES
Non traumatic back pain starting 2-3 months ago. Pt also having nasal congestion starting 2 days ago

## 2024-03-12 NOTE — ED PROVIDER NOTES
Encounter Date: 3/11/2024       History     Chief Complaint   Patient presents with    Back Pain    Nasal Congestion     HPI    32-year-old female presents emergency department for back pain.  States it started months ago and continues.  States that she has not been stretching.  No fall or trauma.  No numbness or tingling to the legs.  She also complains of some runny nose and nasal congestion that has been going on for 2 days.  No fevers.    Review of patient's allergies indicates:  No Known Allergies  Past Medical History:   Diagnosis Date    Morbid obesity      Past Surgical History:   Procedure Laterality Date    BREAST SURGERY       SECTION      DILATION AND CURETTAGE OF UTERUS      DILATION AND CURETTAGE OF UTERUS      US ASPIRATION ABSCESS HEMATOMA SEROMA (BREAST IMAGING)       Family History   Problem Relation Age of Onset    Diabetes Mother     Cancer Father      Social History     Tobacco Use    Smoking status: Some Days     Current packs/day: 0.50     Types: Cigarettes    Smokeless tobacco: Never   Substance Use Topics    Alcohol use: Not Currently     Comment: social    Drug use: Yes     Types: Marijuana     Comment: social     Review of Systems   Constitutional:  Negative for fever.   HENT:  Positive for congestion. Negative for sore throat.    Respiratory:  Negative for cough and shortness of breath.    Cardiovascular:  Negative for chest pain.   Gastrointestinal:  Negative for abdominal pain, constipation, diarrhea, nausea and vomiting.   Genitourinary:  Negative for dysuria.   Musculoskeletal:  Positive for back pain.   Skin:  Negative for rash.   Neurological:  Negative for weakness and headaches.   Hematological:  Does not bruise/bleed easily.   All other systems reviewed and are negative.      Physical Exam     Initial Vitals [24 2140]   BP Pulse Resp Temp SpO2   (!) 126/95 100 18 97.8 °F (36.6 °C) 100 %      MAP       --         Physical Exam    Nursing note and vitals  reviewed.  Constitutional: She appears well-developed and well-nourished. She is Obese . No distress.   Cardiovascular:  Normal rate and regular rhythm.           Pulmonary/Chest: Breath sounds normal. No respiratory distress. She has no wheezes. She has no rhonchi. She has no rales.   Abdominal: Abdomen is soft. There is no abdominal tenderness. There is no rebound and no guarding.   Musculoskeletal:         General: Tenderness (tenderness to the left lumbar paraspinal musculature with palpation reproducing patient's complaint) present. Normal range of motion.      Comments: No midline C/T/L-spine tenderness     Neurological: She is alert and oriented to person, place, and time. She has normal strength.   Skin: Skin is warm. Capillary refill takes less than 2 seconds.         ED Course   Procedures  Labs Reviewed   URINALYSIS, REFLEX TO URINE CULTURE - Abnormal; Notable for the following components:       Result Value    Color, UA Red (*)     Protein,  (*)     Ketones, UA Trace (*)     Blood, UA Large (*)     Nitrites, UA Positive (*)     All other components within normal limits   COVID/FLU A&B PCR - Normal    Narrative:     The Xpert Xpress SARS-CoV-2/FLU/RSV plus is a rapid, multiplexed real-time PCR test intended for the simultaneous qualitative detection and differentiation of SARS-CoV-2, Influenza A, Influenza B, and respiratory syncytial virus (RSV) viral RNA in either nasopharyngeal swab or nasal swab specimens.         PREGNANCY TEST, URINE RAPID - Normal   URINALYSIS, MICROSCOPIC          Imaging Results    None          Medications   ketorolac injection 30 mg (has no administration in time range)     Medical Decision Making  differential diagnosis  Muscle spasm, chronic back pain, viral syndrome, nasal congestion,  as well as multiple other possible etiologies      Problems Addressed:  Nasal congestion: self-limited or minor problem  Strain of lumbar region, initial encounter: chronic illness or  injury with exacerbation, progression, or side effects of treatment    Amount and/or Complexity of Data Reviewed  Labs:  Decision-making details documented in ED Course.    Risk  Prescription drug management.               ED Course as of 03/12/24 0032   Mon Mar 11, 2024   2231 SARS-CoV2 (COVID-19) Qualitative PCR: Not Detected [BS]   2232 Influenza B, Molecular: Not Detected [BS]   2232 Influenza A, Molecular: Not Detected [BS]   2234 SARS-CoV2 (COVID-19) Qualitative PCR: Not Detected [BS]   2237 Influenza B, Molecular: Not Detected [BS]   2237 Influenza A, Molecular: Not Detected [BS]   Tue Mar 12, 2024   0032 hCG Qualitative, Urine: Negative [BS]      ED Course User Index  [BS] Igor Maynard MD                           Clinical Impression:  Final diagnoses:  [R09.81] Nasal congestion (Primary)  [S39.012A] Strain of lumbar region, initial encounter          ED Disposition Condition    Discharge Stable          ED Prescriptions       Medication Sig Dispense Start Date End Date Auth. Provider    ketorolac (TORADOL) 10 mg tablet Take 1 tablet (10 mg total) by mouth every 6 (six) hours as needed. 20 tablet 3/12/2024 3/17/2024 Igor Maynard MD    cyclobenzaprine (FLEXERIL) 5 MG tablet Take 1 tablet (5 mg total) by mouth 3 (three) times daily as needed. 10 tablet 3/12/2024 3/22/2024 Igor Maynard MD    fluticasone propionate (FLONASE) 50 mcg/actuation nasal spray 1 spray (50 mcg total) by Each Nostril route once daily. 15 g 3/12/2024 -- Igor Maynard MD          Follow-up Information       Follow up With Specialties Details Why Contact Info    [facility] - Emergency Dept Emergency Medicine Go to  If symptoms worsen [id number]  [facility] [address] [phone]             Igor Maynard MD  03/12/24 0032

## 2024-03-14 ENCOUNTER — OFFICE VISIT (OUTPATIENT)
Dept: URGENT CARE | Facility: CLINIC | Age: 34
End: 2024-03-14
Payer: MEDICAID

## 2024-03-14 VITALS
HEART RATE: 91 BPM | DIASTOLIC BLOOD PRESSURE: 87 MMHG | TEMPERATURE: 98 F | OXYGEN SATURATION: 100 % | WEIGHT: 292.38 LBS | SYSTOLIC BLOOD PRESSURE: 133 MMHG | BODY MASS INDEX: 49.92 KG/M2 | RESPIRATION RATE: 16 BRPM | HEIGHT: 64 IN

## 2024-03-14 DIAGNOSIS — M54.50 LOW BACK PAIN, UNSPECIFIED BACK PAIN LATERALITY, UNSPECIFIED CHRONICITY, UNSPECIFIED WHETHER SCIATICA PRESENT: Primary | ICD-10-CM

## 2024-03-14 PROCEDURE — 99204 OFFICE O/P NEW MOD 45 MIN: CPT | Mod: S$PBB,,, | Performed by: STUDENT IN AN ORGANIZED HEALTH CARE EDUCATION/TRAINING PROGRAM

## 2024-03-14 PROCEDURE — 63600175 PHARM REV CODE 636 W HCPCS: Performed by: STUDENT IN AN ORGANIZED HEALTH CARE EDUCATION/TRAINING PROGRAM

## 2024-03-14 PROCEDURE — 99214 OFFICE O/P EST MOD 30 MIN: CPT | Mod: PBBFAC | Performed by: STUDENT IN AN ORGANIZED HEALTH CARE EDUCATION/TRAINING PROGRAM

## 2024-03-14 RX ORDER — DEXAMETHASONE SODIUM PHOSPHATE 100 MG/10ML
10 INJECTION INTRAMUSCULAR; INTRAVENOUS ONCE
Status: COMPLETED | OUTPATIENT
Start: 2024-03-14 | End: 2024-03-14

## 2024-03-14 RX ADMIN — DEXAMETHASONE SODIUM PHOSPHATE 10 MG: 10 INJECTION INTRAMUSCULAR; INTRAVENOUS at 08:03

## 2024-03-14 NOTE — LETTER
March 14, 2024      [facility]  [address] [address]  Phone: [phone]       Patient: Taylor Crain   YOB: 1990  Date of Visit: 03/14/2024    To Whom It May Concern:    Mariama Crain  was at [facility] on 03/14/2024. The patient may return to work/school on 3/15/24 with no restrictions. If you have any questions or concerns, or if I can be of further assistance, please do not hesitate to contact me.    Sincerely,    CYRUS Pat MD

## 2024-03-14 NOTE — LETTER
March 14, 2024      [facility]  [address] [address]  Phone: [phone]       Patient: Taylor Crain   YOB: 1990  Date of Visit: 03/14/2024    To Whom It May Concern:    Mariama Crain  was at [facility] on 03/14/2024. The patient may return to work/school on 3/16/24 with no restrictions. If you have any questions or concerns, or if I can be of further assistance, please do not hesitate to contact me.    Sincerely,    CYRUS Pat MD

## 2024-03-15 NOTE — PROGRESS NOTES
"Subjective:      Patient ID: Taylor Crain is a 33 y.o. female.    Vitals:  height is 5' 4" (1.626 m) and weight is 132.6 kg (292 lb 6.4 oz). Her temperature is 98.4 °F (36.9 °C). Her blood pressure is 133/87 and her pulse is 91. Her respiration is 16 and oxygen saturation is 100%.     Chief Complaint: Back Pain (Lower back, Rt flank pain. States x 6 months. Seen in ED 3 days PTA. Received IM. UA completed there. Unable to obtain urine at this time. ) and Referral (PCP)    Back Pain      Taylor Crain is a 33-year-old female presenting to the urgent care clinic today with low back pain that has been going on for the past 6 months.  Patient states the pain is localized in the lumbosacral region, with radiating pain down to the right buttock.  Patient describes the pain as an aching/throbbing pain to the right buttock.  She states that she has been seen at multiple urgent care/emergency rooms for her back pain and nobody has been able to help her in the long run.  Patient does not have a primary care physician.  She states that she has tried over-the-counter NSAIDs/Tylenol and they provide only minimal relief.  Injections of Toradol have provided her more long-lasting relief then the over-the-counter NSAIDs.    Musculoskeletal:  Positive for back pain.      Objective:     Physical Exam   Constitutional: She is oriented to person, place, and time. She appears well-developed. She is cooperative.   HENT:   Head: Normocephalic and atraumatic.   Ears:   Right Ear: Hearing, tympanic membrane, external ear and ear canal normal.   Left Ear: Hearing, tympanic membrane, external ear and ear canal normal.   Nose: Nose normal. No mucosal edema or nasal deformity. No epistaxis. Right sinus exhibits no maxillary sinus tenderness and no frontal sinus tenderness. Left sinus exhibits no maxillary sinus tenderness and no frontal sinus tenderness.   Mouth/Throat: Uvula is midline, oropharynx is clear and moist and mucous membranes " are normal. No trismus in the jaw. Normal dentition. No uvula swelling.   Eyes: Conjunctivae and lids are normal.   Neck: Trachea normal and phonation normal. Neck supple.   Cardiovascular: Normal rate, regular rhythm, normal heart sounds and normal pulses.   Pulmonary/Chest: Effort normal and breath sounds normal.   Abdominal: Normal appearance and bowel sounds are normal. Soft.   Musculoskeletal: Normal range of motion.         General: Tenderness (Lumbosacral paraspinal tenderness to palpation bilaterally) present. No swelling, deformity or signs of injury. Normal range of motion.      Comments: Positive straight leg raise on the left   Neurological: She is alert and oriented to person, place, and time. She exhibits normal muscle tone.   Skin: Skin is warm, dry and intact.   Psychiatric: Her speech is normal and behavior is normal. Judgment and thought content normal.   Nursing note and vitals reviewed.      Assessment:     1. Low back pain, unspecified back pain laterality, unspecified chronicity, unspecified whether sciatica present        Plan:     Patient presents to the urgent care clinic today with chronic low back pain that has been going on for the past 6 months and has been on aided by over-the-counter analgesics.  History and clinical evaluation without any red flag symptoms.  CT abdomen/pelvis from 01/17/2023 reviewed and no acute spinal injuries noted on CT.  Currently in the urgent care we do not have a radiology tech and are unable to get an x-ray of the lumbosacral spine.  Offered the patient that she may return to clinic tomorrow for re-evaluation/x-ray of the spine given that pain has been going on for 6+ months.  Recommended that patient continue with her prescribed muscle relaxants.  We will give a injection of dexamethasone today for acute relief of the pain.  We will refer patient to family practice for chronic management of her back pain and all her other medical conditions.  Return to  clinic/ER going precautions discussed with patient.    Low back pain, unspecified back pain laterality, unspecified chronicity, unspecified whether sciatica present  -     Cancel: POCT Urinalysis, Dipstick, Automated, W/O Scope  -     dexAMETHasone injection 10 mg  -     Ambulatory referral/consult to Family Practice      This note is dictated using the M*Modal Fluency Direct word recognition program. There are word recognition mistakes that are occasionally missed on review.    Jack Pat MD

## 2024-05-30 ENCOUNTER — HOSPITAL ENCOUNTER (EMERGENCY)
Facility: HOSPITAL | Age: 34
Discharge: HOME | End: 2024-05-30
Attending: EMERGENCY MEDICINE
Payer: MEDICAID

## 2024-05-30 VITALS
HEART RATE: 94 BPM | HEIGHT: 64 IN | BODY MASS INDEX: 50.02 KG/M2 | TEMPERATURE: 99 F | OXYGEN SATURATION: 99 % | RESPIRATION RATE: 18 BRPM | WEIGHT: 293 LBS | SYSTOLIC BLOOD PRESSURE: 124 MMHG | DIASTOLIC BLOOD PRESSURE: 75 MMHG

## 2024-05-30 DIAGNOSIS — N64.52 NIPPLE DISCHARGE IN FEMALE: Primary | ICD-10-CM

## 2024-05-30 DIAGNOSIS — N64.4 BREAST PAIN IN FEMALE: ICD-10-CM

## 2024-05-30 PROCEDURE — 99284 EMERGENCY DEPT VISIT MOD MDM: CPT

## 2024-05-30 RX ORDER — SULFAMETHOXAZOLE AND TRIMETHOPRIM 800; 160 MG/1; MG/1
1 TABLET ORAL 2 TIMES DAILY
Qty: 14 TABLET | Refills: 0 | Status: SHIPPED | OUTPATIENT
Start: 2024-05-30 | End: 2024-06-06

## 2024-05-30 RX ORDER — DICLOFENAC SODIUM 50 MG/1
50 TABLET, DELAYED RELEASE ORAL 2 TIMES DAILY PRN
Qty: 20 TABLET | Refills: 0 | Status: SHIPPED | OUTPATIENT
Start: 2024-05-30

## 2024-05-30 RX ORDER — TRAMADOL HYDROCHLORIDE 50 MG/1
50 TABLET ORAL EVERY 6 HOURS PRN
Qty: 12 TABLET | Refills: 0 | Status: SHIPPED | OUTPATIENT
Start: 2024-05-30 | End: 2024-06-02

## 2024-05-30 NOTE — Clinical Note
"Taylor Watson" Paras was seen and treated in our emergency department on 5/30/2024.  She may return to work on 06/01/2024.       If you have any questions or concerns, please don't hesitate to call.       RN    "

## 2024-05-30 NOTE — ED PROVIDER NOTES
Encounter Date: 2024       History     Chief Complaint   Patient presents with    Abscess     C/o leonor breast pain and bloody, purulent discharge X 2 years. States previously had to have surgery to resolve the issue, but has not been to a physician since then.      The patient is a 34 y.o. female who presents to the Emergency Department with a chief complaint of bilateral breast discharge.  Patient reports history of multiple breast abscess.  She states she has had multiple surgeries for incision and drainage of breast abscess.  However, patient states this feels slightly different.  Symptoms began 2 years ago and have been intermittent since onset. Her pain is currently rated as a 5/10 in severity and described as aching with no radiation. Associated symptoms include pain. Symptoms are aggravated with nothing and there are no alleviating factors. The patient denies fever or chills. She reports taking nothing prior to arrival with no relief of symptoms. No other reported symptoms at this time.      The history is provided by the patient. No  was used.   General Illness   The current episode started several weeks ago. The problem occurs occasionally. The problem has been unchanged. The pain is at a severity of 5/10. Nothing relieves the symptoms. Nothing aggravates the symptoms. Pertinent negatives include no fever, no nausea, no sore throat, no shortness of breath and no rash. She has received no recent medical care.     Review of patient's allergies indicates:  No Known Allergies  Past Medical History:   Diagnosis Date    Morbid obesity      Past Surgical History:   Procedure Laterality Date    BREAST SURGERY       SECTION      DILATION AND CURETTAGE OF UTERUS      DILATION AND CURETTAGE OF UTERUS      US ASPIRATION ABSCESS HEMATOMA SEROMA (BREAST IMAGING)       Family History   Problem Relation Name Age of Onset    Diabetes Mother Laura gama     Cancer Father Kobe renee austin.       Social History     Tobacco Use    Smoking status: Some Days     Current packs/day: 0.50     Types: Cigarettes    Smokeless tobacco: Never   Substance Use Topics    Alcohol use: Not Currently     Comment: social    Drug use: Yes     Types: Marijuana     Comment: social     Review of Systems   Constitutional:  Negative for fever.   HENT:  Negative for sore throat.    Respiratory:  Negative for shortness of breath.    Cardiovascular:  Negative for chest pain.   Gastrointestinal:  Negative for nausea.   Genitourinary:  Negative for dysuria.   Musculoskeletal:  Negative for back pain.   Skin:  Negative for rash.        Breast drainage   Neurological:  Negative for weakness.   Hematological:  Does not bruise/bleed easily.   All other systems reviewed and are negative.      Physical Exam     Initial Vitals [05/30/24 1531]   BP Pulse Resp Temp SpO2   124/75 94 18 98.6 °F (37 °C) 99 %      MAP       --         Physical Exam    Nursing note and vitals reviewed.  Constitutional: She appears well-developed and well-nourished.   HENT:   Head: Normocephalic.   Right Ear: Hearing and tympanic membrane normal.   Left Ear: Hearing and tympanic membrane normal.   Mouth/Throat: Uvula is midline, oropharynx is clear and moist and mucous membranes are normal.   Eyes: Conjunctivae and EOM are normal. Pupils are equal, round, and reactive to light.   Cardiovascular:  Normal rate, regular rhythm, normal heart sounds and normal pulses.           Pulmonary/Chest: Effort normal and breath sounds normal. Right breast exhibits nipple discharge and tenderness. Right breast exhibits no mass and no skin change. Left breast exhibits nipple discharge and tenderness. Left breast exhibits no mass and no skin change.   Tenderness present to bilateral breasts.  There are no areas of fluctuance on exam.  However, patient does have a small amount of expressible serosanguineous fluid from nipples.     Lymphadenopathy:     She has no cervical  adenopathy.   Neurological: She is alert. GCS eye subscore is 4. GCS verbal subscore is 5. GCS motor subscore is 6.   Skin: Skin is warm, dry and intact. Capillary refill takes less than 2 seconds.         ED Course   Procedures  Labs Reviewed - No data to display       Imaging Results    None          Medications - No data to display  Medical Decision Making  The patient is a 34 y.o. female who presents to the Emergency Department with a chief complaint of bilateral breast discharge.  Patient reports history of multiple breast abscess.  She states she has had multiple surgeries for incision and drainage of breast abscess.  However, patient states this feels slightly different.  Symptoms began 2 years ago and have been intermittent since onset. Her pain is currently rated as a 5/10 in severity and described as aching with no radiation. Associated symptoms include pain. Symptoms are aggravated with nothing and there are no alleviating factors. The patient denies fever or chills. She reports taking nothing prior to arrival with no relief of symptoms. No other reported symptoms at this time.      Judging by the patient's chief complaint and pertinent history, the patient has the following possible differential diagnoses, including but not limited to the following.  Some of these are deemed to be lower likelihood and some more likely based on my physical exam and history combined with possible lab work and/or imaging studies.   Please see the pertinent studies, and refer to the HPI.  Some of these diagnoses will take further evaluation to fully rule out, perhaps as an outpatient and the patient was encouraged to follow up when discharged for more comprehensive evaluation.    Abscess, malignancy, mastalgia     Problems Addressed:  Breast pain in female: acute illness or injury  Nipple discharge in female: acute illness or injury               ED Course as of 05/30/24 1617   Thu May 30, 2024   1608 I discussed with  patient that she needs to follow up with breast surgery. She states that she has been intermittently having this symptoms for about two years. She has previously seen breast surgery but states it has been over a year. Will send referral and discharge patient home on analgesics and antibiotics.  [LM]      ED Course User Index  [LM] Ghazal Camacho NP                           Clinical Impression:  Final diagnoses:  [N64.52] Nipple discharge in female (Primary)  [N64.4] Breast pain in female          ED Disposition Condition    Discharge Stable          ED Prescriptions       Medication Sig Dispense Start Date End Date Auth. Provider    sulfamethoxazole-trimethoprim 800-160mg (BACTRIM DS) 800-160 mg Tab Take 1 tablet by mouth 2 (two) times daily. for 7 days 14 tablet 5/30/2024 6/6/2024 Ghazal Camacho NP    diclofenac (VOLTAREN) 50 MG EC tablet Take 1 tablet (50 mg total) by mouth 2 (two) times daily as needed. 20 tablet 5/30/2024 -- Ghazal Camacho NP    traMADoL (ULTRAM) 50 mg tablet Take 1 tablet (50 mg total) by mouth every 6 (six) hours as needed for Pain. 12 tablet 5/30/2024 6/2/2024 Ghazal Camacho NP          Follow-up Information       Follow up With Specialties Details Why Contact Info    [facility] Breast Surgery Schedule an appointment as soon as possible for a visit   [address] [address] [phone]             Ghazal Camacho NP  05/30/24 5969

## 2024-05-30 NOTE — ED TRIAGE NOTES
C/o leonor breast pain and bloody, purulent discharge X 2 years. States previously had to have surgery to resolve the issue, but has not been to a physician since then.

## 2024-09-02 ENCOUNTER — HOSPITAL ENCOUNTER (EMERGENCY)
Facility: HOSPITAL | Age: 34
Discharge: HOME | End: 2024-09-02
Attending: STUDENT IN AN ORGANIZED HEALTH CARE EDUCATION/TRAINING PROGRAM
Payer: MEDICAID

## 2024-09-02 VITALS
HEART RATE: 94 BPM | WEIGHT: 275 LBS | OXYGEN SATURATION: 100 % | HEIGHT: 64 IN | BODY MASS INDEX: 46.95 KG/M2 | SYSTOLIC BLOOD PRESSURE: 150 MMHG | TEMPERATURE: 99 F | RESPIRATION RATE: 18 BRPM | DIASTOLIC BLOOD PRESSURE: 110 MMHG

## 2024-09-02 DIAGNOSIS — R03.0 ELEVATED BLOOD PRESSURE READING WITHOUT DIAGNOSIS OF HYPERTENSION: ICD-10-CM

## 2024-09-02 DIAGNOSIS — J06.9 VIRAL URI WITH COUGH: Primary | ICD-10-CM

## 2024-09-02 PROCEDURE — 99282 EMERGENCY DEPT VISIT SF MDM: CPT

## 2024-09-02 PROCEDURE — 0240U COVID/FLU A&B PCR: CPT | Performed by: STUDENT IN AN ORGANIZED HEALTH CARE EDUCATION/TRAINING PROGRAM

## 2024-09-02 RX ORDER — FLUTICASONE PROPIONATE 50 MCG
1 SPRAY, SUSPENSION (ML) NASAL DAILY
Qty: 15 G | Refills: 0 | Status: SHIPPED | OUTPATIENT
Start: 2024-09-02

## 2024-09-02 NOTE — Clinical Note
"Taylor Bunnviviana Crain was seen and treated in our emergency department on 9/2/2024.  She may return to work on 09/04/2024.       If you have any questions or concerns, please don't hesitate to call.      Igor Maynard MD"
Clothing/locker #2

## 2024-09-02 NOTE — ED PROVIDER NOTES
Encounter Date: 2024       History     Chief Complaint   Patient presents with    Nasal Congestion     Pt complaint of nasal congestion for 3 days     HPI    34-year-old female with a past medical history of obesity presents emergency department for cough congestion that has been ongoing for last 3 days.  Patient states she has been taking some over-the-counter cough medicine which has been helping slightly with a cough.  Denies having any fever.  States she was very congested in her nose is running.  States the cough is continuous.  States that it is a nonproductive cough.  No chest pain or shortness of breath.    Review of patient's allergies indicates:  No Known Allergies  Past Medical History:   Diagnosis Date    Morbid obesity      Past Surgical History:   Procedure Laterality Date    BREAST SURGERY       SECTION      DILATION AND CURETTAGE OF UTERUS      DILATION AND CURETTAGE OF UTERUS      US ASPIRATION ABSCESS HEMATOMA SEROMA (BREAST IMAGING)       Family History   Problem Relation Name Age of Onset    Diabetes Mother Laura gama     Cancer Father Kobe duran sr.      Social History     Tobacco Use    Smoking status: Some Days     Current packs/day: 0.50     Types: Cigarettes    Smokeless tobacco: Never   Substance Use Topics    Alcohol use: Not Currently     Comment: social    Drug use: Yes     Types: Marijuana     Comment: social     Review of Systems   Constitutional:  Negative for fever.   HENT:  Positive for congestion.    Respiratory:  Positive for cough.    Cardiovascular:  Negative for chest pain.   Gastrointestinal:  Negative for abdominal pain, constipation, diarrhea, nausea and vomiting.   Neurological:  Negative for headaches.   All other systems reviewed and are negative.      Physical Exam     Initial Vitals [24 0706]   BP Pulse Resp Temp SpO2   (!) 150/110 94 18 98.6 °F (37 °C) 100 %      MAP       --         Physical Exam    Nursing note and vitals  reviewed.  Constitutional: She appears well-developed and well-nourished. She is Obese . No distress.   HENT:   Nose: Rhinorrhea present.   Cardiovascular:  Normal rate and regular rhythm.           Pulmonary/Chest: Breath sounds normal. No respiratory distress. She has no wheezes. She has no rhonchi. She has no rales.   Abdominal: Abdomen is soft. There is no abdominal tenderness. There is no rebound and no guarding.   Musculoskeletal:         General: No tenderness. Normal range of motion.     Neurological: She is alert and oriented to person, place, and time. She has normal strength.   Skin: Skin is warm. Capillary refill takes less than 2 seconds.         ED Course   Procedures  Labs Reviewed   COVID/FLU A&B PCR - Normal       Result Value    Influenza A PCR Not Detected      Influenza B PCR Not Detected      SARS-CoV-2 PCR Not Detected      Narrative:     The Xpert Xpress SARS-CoV-2/FLU/RSV plus is a rapid, multiplexed real-time PCR test intended for the simultaneous qualitative detection and differentiation of SARS-CoV-2, Influenza A, Influenza B, and respiratory syncytial virus (RSV) viral RNA in either nasopharyngeal swab or nasal swab specimens.                Imaging Results    None          Medications - No data to display  Medical Decision Making  Initial Assessment:   URI      Differential Diagnosis:   Judging by the patient's chief complaint and pertinent history, the patient has the following possible differential diagnoses, including but not limited to the following.  Some of these are deemed to be lower likelihood and some more likely based on my physical exam and history combined with possible lab work and/or imaging studies.   Please see the pertinent studies, and refer to the HPI.  Some of these diagnoses will take further evaluation to fully rule out, perhaps as an outpatient and the patient was encouraged to follow up when discharged for more comprehensive evaluation.  URI, COVID, flu,  as well  as multiple other possible etiologies      Problems Addressed:  Elevated blood pressure reading without diagnosis of hypertension: undiagnosed new problem with uncertain prognosis  Viral URI with cough: self-limited or minor problem               ED Course as of 09/02/24 0805   Mon Sep 02, 2024   0803 SARS-CoV2 (COVID-19) Qualitative PCR: Not Detected [BS]   0804 Influenza B, Molecular: Not Detected [BS]   0804 Influenza A, Molecular: Not Detected [BS]      ED Course User Index  [BS] Igor Maynard MD                           Clinical Impression:  Final diagnoses:  [J06.9] Viral URI with cough (Primary)  [R03.0] Elevated blood pressure reading without diagnosis of hypertension          ED Disposition Condition    Discharge Stable          ED Prescriptions       Medication Sig Dispense Start Date End Date Auth. Provider    fluticasone propionate (FLONASE) 50 mcg/actuation nasal spray 1 spray (50 mcg total) by Each Nostril route once daily. 15 g 9/2/2024 -- Igor Maynard MD          Follow-up Information       Follow up With Specialties Details Why Contact Info    [facility] - Emergency Dept Emergency Medicine Go to  If symptoms worsen [id number]  [place] [id number] [phone]    Follow up with primary care.  Check blood pressure twice a day.  Blood pressure remains elevated you may need to be started on antihypertensive medications                 Igor Maynard MD  09/02/24 0805

## 2024-09-08 ENCOUNTER — HOSPITAL ENCOUNTER (EMERGENCY)
Facility: HOSPITAL | Age: 34
Discharge: HOME | End: 2024-09-08
Attending: EMERGENCY MEDICINE
Payer: MEDICAID

## 2024-09-08 VITALS
BODY MASS INDEX: 47.63 KG/M2 | HEART RATE: 79 BPM | DIASTOLIC BLOOD PRESSURE: 106 MMHG | HEIGHT: 64 IN | SYSTOLIC BLOOD PRESSURE: 151 MMHG | WEIGHT: 279 LBS | RESPIRATION RATE: 20 BRPM | TEMPERATURE: 98 F | OXYGEN SATURATION: 99 %

## 2024-09-08 DIAGNOSIS — U07.1 COVID-19 VIRUS INFECTION: Primary | ICD-10-CM

## 2024-09-08 LAB
FLUAV AG UPPER RESP QL IA.RAPID: NOT DETECTED
FLUBV AG UPPER RESP QL IA.RAPID: NOT DETECTED
SARS-COV-2 RNA RESP QL NAA+PROBE: DETECTED

## 2024-09-08 PROCEDURE — 99283 EMERGENCY DEPT VISIT LOW MDM: CPT

## 2024-09-08 PROCEDURE — 0240U COVID/FLU A&B PCR: CPT | Performed by: EMERGENCY MEDICINE

## 2024-09-08 RX ORDER — BENZONATATE 100 MG/1
100 CAPSULE ORAL 3 TIMES DAILY PRN
Qty: 20 CAPSULE | Refills: 0 | Status: SHIPPED | OUTPATIENT
Start: 2024-09-08 | End: 2024-09-18

## 2024-09-08 RX ORDER — PSEUDOEPHEDRINE HYDROCHLORIDE 60 MG/1
60 TABLET ORAL EVERY 6 HOURS PRN
Qty: 20 TABLET | Refills: 0 | Status: SHIPPED | OUTPATIENT
Start: 2024-09-08 | End: 2024-09-18

## 2024-09-08 NOTE — Clinical Note
"Taylor Watson"Paras was seen and treated in our emergency department on 9/8/2024.  She may return to work on 09/11/2024.       If you have any questions or concerns, please don't hesitate to call.      Chacho Patel MD"

## 2024-09-08 NOTE — ED PROVIDER NOTES
Encounter Date: 2024       History     Chief Complaint   Patient presents with    Cough     Pt c/o cough, congestion and aches onset one week ago.     34-year-old female presents with cough, congestion, runny nose and body aches x1 week.  Patient says she initially had some fever but is currently fever free.  No wheezing but she does say that she is short of breath.  Patient's 3 daughters also have similar symptoms.    The history is provided by the patient.     Review of patient's allergies indicates:  No Known Allergies  Past Medical History:   Diagnosis Date    Morbid obesity      Past Surgical History:   Procedure Laterality Date    BREAST SURGERY       SECTION      DILATION AND CURETTAGE OF UTERUS      DILATION AND CURETTAGE OF UTERUS      US ASPIRATION ABSCESS HEMATOMA SEROMA (BREAST IMAGING)       Family History   Problem Relation Name Age of Onset    Diabetes Mother Laura gama     Cancer Father Kobe duran sr.      Social History     Tobacco Use    Smoking status: Some Days     Current packs/day: 0.50     Types: Cigarettes    Smokeless tobacco: Never   Substance Use Topics    Alcohol use: Not Currently     Comment: social    Drug use: Yes     Types: Marijuana     Comment: social     Review of Systems   Constitutional:  Negative for chills, diaphoresis, fatigue and fever.   HENT:  Positive for congestion. Negative for drooling, ear discharge, ear pain, postnasal drip, rhinorrhea, sinus pressure, sinus pain, sore throat and tinnitus.    Eyes:  Negative for discharge.   Respiratory:  Positive for cough. Negative for chest tightness, shortness of breath and wheezing.    Cardiovascular:  Negative for chest pain and palpitations.   Gastrointestinal:  Negative for abdominal pain, diarrhea, nausea and vomiting.   Genitourinary:  Negative for frequency, hematuria and urgency.   Musculoskeletal:  Negative for back pain, neck pain and neck stiffness.   Skin:  Negative for rash.   Neurological:  Negative  for syncope, weakness, light-headedness, numbness and headaches.   Psychiatric/Behavioral:  Negative for suicidal ideas.        Physical Exam     Initial Vitals [09/08/24 0840]   BP Pulse Resp Temp SpO2   (!) 151/106 79 20 97.9 °F (36.6 °C) 99 %      MAP       --         Physical Exam    Nursing note and vitals reviewed.  Constitutional: She is Obese . No distress.   HENT:   Head: Atraumatic.   Nose: Rhinorrhea present.   Mouth/Throat: Oropharynx is clear and moist.   Eyes: Conjunctivae are normal.   Neck:   Normal range of motion.  Cardiovascular:  Normal rate.           Pulmonary/Chest: Breath sounds normal. No respiratory distress.   Abdominal: There is no abdominal tenderness.   Musculoskeletal:         General: Normal range of motion.      Cervical back: Normal range of motion.     Neurological: She is alert and oriented to person, place, and time. She has normal strength.   Skin: Skin is warm and dry.         ED Course   Procedures  Labs Reviewed   COVID/FLU A&B PCR - Abnormal       Result Value    Influenza A PCR Not Detected      Influenza B PCR Not Detected      SARS-CoV-2 PCR Detected (*)     Narrative:     The Xpert Xpress SARS-CoV-2/FLU/RSV plus is a rapid, multiplexed real-time PCR test intended for the simultaneous qualitative detection and differentiation of SARS-CoV-2, Influenza A, Influenza B, and respiratory syncytial virus (RSV) viral RNA in either nasopharyngeal swab or nasal swab specimens.                Imaging Results    None          Medications - No data to display  Medical Decision Making  Medical Decision Making  Problem list/ differential diagnosis including but not limited to:  COVID, flu, sinusitis, seasonal allergies, URI    Patient's chronic illnesses impacting care:  none    Diagnostic test considered but not ordered:    My interpretations:      Radiology reports      Discussion of case with external qualified healthcare professionals:  Not applicable    Review of external notes(  inpt, ems, NH, clinic):      Decision rules/scores:    Medications reviewed:  Medications ordered in the ER:  Discharge prescriptions:    Social variables possible impacting patient's healthcare:    Code status/discussion    Shared decision making:    Consideration for admission versus discharge: stable for discharge     Amount and/or Complexity of Data Reviewed  Labs:  Decision-making details documented in ED Course.    Risk  OTC drugs.  Prescription drug management.               ED Course as of 09/08/24 1052   Sun Sep 08, 2024   1009 SARS-CoV2 (COVID-19) Qualitative PCR(!): Detected [WC]      ED Course User Index  [WC] Chacho Patel MD                           Clinical Impression:  Final diagnoses:  [U07.1] COVID-19 virus infection (Primary)          ED Disposition Condition    Discharge Stable          ED Prescriptions       Medication Sig Dispense Start Date End Date Auth. Provider    benzonatate (TESSALON) 100 MG capsule Take 1 capsule (100 mg total) by mouth 3 (three) times daily as needed. 20 capsule 9/8/2024 9/18/2024 Chacho Patel MD    pseudoephedrine (SUDAFED) 60 MG tablet Take 1 tablet (60 mg total) by mouth every 6 (six) hours as needed. 20 tablet 9/8/2024 9/18/2024 Chacho Patel MD          Follow-up Information    None          Chacho Patel MD  09/08/24 1052

## 2024-09-21 ENCOUNTER — HOSPITAL ENCOUNTER (EMERGENCY)
Facility: HOSPITAL | Age: 34
Discharge: HOME | End: 2024-09-22
Attending: EMERGENCY MEDICINE
Payer: MEDICAID

## 2024-09-21 DIAGNOSIS — R10.30 LOWER ABDOMINAL PAIN: ICD-10-CM

## 2024-09-21 DIAGNOSIS — M54.50 ACUTE MIDLINE LOW BACK PAIN WITHOUT SCIATICA: Primary | ICD-10-CM

## 2024-09-21 LAB
ALBUMIN SERPL-MCNC: 3.8 G/DL (ref 3.5–5)
ALBUMIN/GLOB SERPL: 1.2 RATIO (ref 1.1–2)
ALP SERPL-CCNC: 64 UNIT/L (ref 40–150)
ALT SERPL-CCNC: 10 UNIT/L (ref 0–55)
ANION GAP SERPL CALC-SCNC: 9 MEQ/L
AST SERPL-CCNC: 13 UNIT/L (ref 5–34)
B-HCG UR QL: NEGATIVE
BASOPHILS # BLD AUTO: 0.07 X10(3)/MCL
BASOPHILS NFR BLD AUTO: 0.7 %
BILIRUB SERPL-MCNC: 0.3 MG/DL
BILIRUB UR QL STRIP.AUTO: NEGATIVE
BUN SERPL-MCNC: 10.6 MG/DL (ref 7–18.7)
CALCIUM SERPL-MCNC: 9.3 MG/DL (ref 8.4–10.2)
CHLORIDE SERPL-SCNC: 110 MMOL/L (ref 98–107)
CLARITY UR: CLEAR
CO2 SERPL-SCNC: 23 MMOL/L (ref 22–29)
COLOR UR AUTO: NORMAL
CREAT SERPL-MCNC: 0.79 MG/DL (ref 0.55–1.02)
CREAT/UREA NIT SERPL: 13
EOSINOPHIL # BLD AUTO: 0.42 X10(3)/MCL (ref 0–0.9)
EOSINOPHIL NFR BLD AUTO: 4.2 %
ERYTHROCYTE [DISTWIDTH] IN BLOOD BY AUTOMATED COUNT: 15.9 % (ref 11.5–17)
GFR SERPLBLD CREATININE-BSD FMLA CKD-EPI: >60 ML/MIN/1.73/M2
GLOBULIN SER-MCNC: 3.2 GM/DL (ref 2.4–3.5)
GLUCOSE SERPL-MCNC: 98 MG/DL (ref 74–100)
GLUCOSE UR QL STRIP: NEGATIVE
HCT VFR BLD AUTO: 38.7 % (ref 37–47)
HGB BLD-MCNC: 11.9 G/DL (ref 12–16)
HGB UR QL STRIP: NEGATIVE
IMM GRANULOCYTES # BLD AUTO: 0.03 X10(3)/MCL (ref 0–0.04)
IMM GRANULOCYTES NFR BLD AUTO: 0.3 %
KETONES UR QL STRIP: NEGATIVE
LEUKOCYTE ESTERASE UR QL STRIP: NEGATIVE
LIPASE SERPL-CCNC: 13 U/L
LYMPHOCYTES # BLD AUTO: 3.69 X10(3)/MCL (ref 0.6–4.6)
LYMPHOCYTES NFR BLD AUTO: 37.3 %
MCH RBC QN AUTO: 22.6 PG (ref 27–31)
MCHC RBC AUTO-ENTMCNC: 30.7 G/DL (ref 33–36)
MCV RBC AUTO: 73.4 FL (ref 80–94)
MONOCYTES # BLD AUTO: 0.57 X10(3)/MCL (ref 0.1–1.3)
MONOCYTES NFR BLD AUTO: 5.8 %
NEUTROPHILS # BLD AUTO: 5.11 X10(3)/MCL (ref 2.1–9.2)
NEUTROPHILS NFR BLD AUTO: 51.7 %
NITRITE UR QL STRIP: NEGATIVE
NRBC BLD AUTO-RTO: 0 %
PH UR STRIP: 6 [PH]
PLATELET # BLD AUTO: 287 X10(3)/MCL (ref 130–400)
PMV BLD AUTO: 10.2 FL (ref 7.4–10.4)
POTASSIUM SERPL-SCNC: 4 MMOL/L (ref 3.5–5.1)
PROT SERPL-MCNC: 7 GM/DL (ref 6.4–8.3)
PROT UR QL STRIP: NEGATIVE
RBC # BLD AUTO: 5.27 X10(6)/MCL (ref 4.2–5.4)
SODIUM SERPL-SCNC: 142 MMOL/L (ref 136–145)
SP GR UR STRIP.AUTO: >=1.03 (ref 1–1.03)
UROBILINOGEN UR STRIP-ACNC: 0.2
WBC # BLD AUTO: 9.89 X10(3)/MCL (ref 4.5–11.5)

## 2024-09-21 PROCEDURE — 83690 ASSAY OF LIPASE: CPT | Performed by: EMERGENCY MEDICINE

## 2024-09-21 PROCEDURE — 87591 N.GONORRHOEAE DNA AMP PROB: CPT | Performed by: EMERGENCY MEDICINE

## 2024-09-21 PROCEDURE — 81003 URINALYSIS AUTO W/O SCOPE: CPT | Performed by: EMERGENCY MEDICINE

## 2024-09-21 PROCEDURE — 99285 EMERGENCY DEPT VISIT HI MDM: CPT | Mod: 25

## 2024-09-21 PROCEDURE — 87491 CHLMYD TRACH DNA AMP PROBE: CPT | Performed by: EMERGENCY MEDICINE

## 2024-09-21 PROCEDURE — 85025 COMPLETE CBC W/AUTO DIFF WBC: CPT | Performed by: EMERGENCY MEDICINE

## 2024-09-21 PROCEDURE — 63600175 PHARM REV CODE 636 W HCPCS: Performed by: EMERGENCY MEDICINE

## 2024-09-21 PROCEDURE — 81025 URINE PREGNANCY TEST: CPT | Performed by: EMERGENCY MEDICINE

## 2024-09-21 PROCEDURE — 80053 COMPREHEN METABOLIC PANEL: CPT | Performed by: EMERGENCY MEDICINE

## 2024-09-21 PROCEDURE — 96374 THER/PROPH/DIAG INJ IV PUSH: CPT | Mod: 59

## 2024-09-21 RX ORDER — HYDROMORPHONE HYDROCHLORIDE 2 MG/ML
1 INJECTION, SOLUTION INTRAMUSCULAR; INTRAVENOUS; SUBCUTANEOUS
Status: COMPLETED | OUTPATIENT
Start: 2024-09-21 | End: 2024-09-21

## 2024-09-21 RX ADMIN — HYDROMORPHONE HYDROCHLORIDE 1 MG: 2 INJECTION INTRAMUSCULAR; INTRAVENOUS; SUBCUTANEOUS at 10:09

## 2024-09-21 NOTE — Clinical Note
"Taylor Bunnviviana Crain was seen and treated in our emergency department on 9/21/2024.  She may return to work on 09/25/2024.       If you have any questions or concerns, please don't hesitate to call.      Montserrat Osmna MD"

## 2024-09-22 VITALS
DIASTOLIC BLOOD PRESSURE: 109 MMHG | TEMPERATURE: 98 F | RESPIRATION RATE: 18 BRPM | HEIGHT: 64 IN | HEART RATE: 89 BPM | OXYGEN SATURATION: 100 % | SYSTOLIC BLOOD PRESSURE: 156 MMHG | BODY MASS INDEX: 47.8 KG/M2 | WEIGHT: 280 LBS

## 2024-09-22 LAB
C TRACH DNA SPEC QL NAA+PROBE: NOT DETECTED
N GONORRHOEA DNA SPEC QL NAA+PROBE: NOT DETECTED
SOURCE (OHS): NORMAL

## 2024-09-22 PROCEDURE — 25500020 PHARM REV CODE 255: Performed by: EMERGENCY MEDICINE

## 2024-09-22 PROCEDURE — 96375 TX/PRO/DX INJ NEW DRUG ADDON: CPT

## 2024-09-22 PROCEDURE — 63600175 PHARM REV CODE 636 W HCPCS: Performed by: EMERGENCY MEDICINE

## 2024-09-22 RX ORDER — ONDANSETRON HYDROCHLORIDE 2 MG/ML
4 INJECTION, SOLUTION INTRAVENOUS
Status: COMPLETED | OUTPATIENT
Start: 2024-09-22 | End: 2024-09-22

## 2024-09-22 RX ORDER — ONDANSETRON 4 MG/1
4 TABLET, ORALLY DISINTEGRATING ORAL EVERY 8 HOURS PRN
Qty: 12 TABLET | Refills: 0 | Status: SHIPPED | OUTPATIENT
Start: 2024-09-22

## 2024-09-22 RX ORDER — DICYCLOMINE HYDROCHLORIDE 10 MG/1
10 CAPSULE ORAL 3 TIMES DAILY
Qty: 9 CAPSULE | Refills: 0 | Status: SHIPPED | OUTPATIENT
Start: 2024-09-22 | End: 2024-09-25

## 2024-09-22 RX ORDER — HYDROCODONE BITARTRATE AND ACETAMINOPHEN 5; 325 MG/1; MG/1
1 TABLET ORAL EVERY 6 HOURS PRN
Qty: 12 TABLET | Refills: 0 | Status: SHIPPED | OUTPATIENT
Start: 2024-09-22

## 2024-09-22 RX ORDER — MORPHINE SULFATE 4 MG/ML
4 INJECTION, SOLUTION INTRAMUSCULAR; INTRAVENOUS
Status: COMPLETED | OUTPATIENT
Start: 2024-09-22 | End: 2024-09-22

## 2024-09-22 RX ADMIN — IOHEXOL 100 ML: 350 INJECTION, SOLUTION INTRAVENOUS at 12:09

## 2024-09-22 RX ADMIN — ONDANSETRON 4 MG: 2 INJECTION INTRAMUSCULAR; INTRAVENOUS at 01:09

## 2024-09-22 RX ADMIN — MORPHINE SULFATE 4 MG: 4 INJECTION INTRAVENOUS at 12:09

## 2024-09-22 NOTE — ED TRIAGE NOTES
Pt c/o lower back pain that radiates to lower part of abdomen. Pt denies vaginal discharge and dysuria.

## 2024-09-22 NOTE — DISCHARGE INSTRUCTIONS
Today we did not find in emergent reason for your complaints.  However if you do not have improvement over the next 48 hours please return to the ER for re-evaluation.  Return sooner if you have any worsening symptoms or if you develop any new symptoms such as fever.

## 2024-09-22 NOTE — ED PROVIDER NOTES
Encounter Date: 2024       History     Chief Complaint   Patient presents with    Back Pain     Patient is a 34-year-old female who is presenting with low back pain and low mid abdominal pain.  Her symptoms started earlier today, gradual in onset but now rated as a 10/10.  She denies any fever, vomiting, diarrhea, or constipation.  She denies any vaginal bleeding, vaginal discharge, or new sexual partners.  She does have a history of intermittent back pain but states this is different than she has ever had it before and she has never had it radiates to her abdomen.  She denies abdominal pain as severe 10/10, cramping in nature.  Back pain is rated 8/10 localized to the lower back at about L5-S1 with some right paraspinal tenderness to palpation.  She denies any history of kidney stones or blood in her urine.  She did drive herself here today but reports she can get a ride.        Review of patient's allergies indicates:  No Known Allergies  Past Medical History:   Diagnosis Date    Morbid obesity      Past Surgical History:   Procedure Laterality Date    BREAST SURGERY       SECTION      DILATION AND CURETTAGE OF UTERUS      DILATION AND CURETTAGE OF UTERUS      US ASPIRATION ABSCESS HEMATOMA SEROMA (BREAST IMAGING)       Family History   Problem Relation Name Age of Onset    Diabetes Mother Laura gama     Cancer Father Kobe duran sr.      Social History     Tobacco Use    Smoking status: Some Days     Current packs/day: 0.50     Types: Cigarettes    Smokeless tobacco: Never   Substance Use Topics    Alcohol use: Not Currently     Comment: social    Drug use: Yes     Types: Marijuana     Comment: social     Review of Systems   Constitutional:  Negative for fever.   HENT:  Negative for sore throat.    Respiratory:  Negative for shortness of breath.    Cardiovascular:  Negative for chest pain.   Gastrointestinal:  Positive for abdominal pain. Negative for nausea.   Genitourinary:  Negative for  dysuria.   Musculoskeletal:  Positive for back pain.   Skin:  Negative for rash.   Neurological:  Negative for weakness.   Hematological:  Does not bruise/bleed easily.       Physical Exam     Initial Vitals [09/21/24 2207]   BP Pulse Resp Temp SpO2   (!) 156/109 79 18 97.7 °F (36.5 °C) 100 %      MAP       --         Physical Exam    Nursing note and vitals reviewed.  Constitutional: She appears well-developed and well-nourished. No distress.   HENT:   Head: Normocephalic and atraumatic.   Neck: Neck supple.   Cardiovascular:  Normal rate, regular rhythm and normal heart sounds.           No murmur heard.  Pulmonary/Chest: Breath sounds normal. No respiratory distress. She has no wheezes. She has no rhonchi.   Abdominal: Abdomen is soft. Bowel sounds are normal. She exhibits no distension. There is abdominal tenderness (diffuse ttp of the abdomen but best localized to the bilateral lower quadrants.). There is guarding. There is no rebound.   Musculoskeletal:         General: No edema.      Cervical back: Neck supple.      Comments: TTP of the lower lumbar spine at midline and in the right paraspinal region     Neurological: She is alert and oriented to person, place, and time.   Skin: Skin is warm and dry.   Psychiatric: She has a normal mood and affect. Thought content normal.         ED Course   Procedures  Labs Reviewed   COMPREHENSIVE METABOLIC PANEL - Abnormal       Result Value    Sodium 142      Potassium 4.0      Chloride 110 (*)     CO2 23      Glucose 98      Blood Urea Nitrogen 10.6      Creatinine 0.79      Calcium 9.3      Protein Total 7.0      Albumin 3.8      Globulin 3.2      Albumin/Globulin Ratio 1.2      Bilirubin Total 0.3      ALP 64      ALT 10      AST 13      eGFR >60      Anion Gap 9.0      BUN/Creatinine Ratio 13     CBC WITH DIFFERENTIAL - Abnormal    WBC 9.89      RBC 5.27      Hgb 11.9 (*)     Hct 38.7      MCV 73.4 (*)     MCH 22.6 (*)     MCHC 30.7 (*)     RDW 15.9      Platelet 287       MPV 10.2      Neut % 51.7      Lymph % 37.3      Mono % 5.8      Eos % 4.2      Basophil % 0.7      Lymph # 3.69      Neut # 5.11      Mono # 0.57      Eos # 0.42      Baso # 0.07      IG# 0.03      IG% 0.3      NRBC% 0.0     URINALYSIS - Normal    Color, UA Straw      Appearance, UA Clear      Specific Gravity, UA >=1.030      pH, UA 6.0      Protein, UA Negative      Glucose, UA Negative      Ketones, UA Negative      Blood, UA Negative      Bilirubin, UA Negative      Urobilinogen, UA 0.2      Nitrites, UA Negative      Leukocyte Esterase, UA Negative     PREGNANCY TEST, URINE RAPID - Normal    hCG Qualitative, Urine Negative     LIPASE - Normal    Lipase Level 13     CHLAMYDIA/GONORRHOEAE(GC), PCR   CBC W/ AUTO DIFFERENTIAL    Narrative:     The following orders were created for panel order CBC W/ AUTO DIFFERENTIAL.  Procedure                               Abnormality         Status                     ---------                               -----------         ------                     CBC with Differential[[id number]]       Abnormal            Final result                 Please view results for these tests on the individual orders.          Imaging Results              CT Abdomen Pelvis With IV Contrast NO Oral Contrast (Preliminary result)  Result time 09/22/24 00:50:39      Preliminary result by Scot Guerrero MD (09/22/24 00:50:39)                   Narrative:    START OF REPORT:  Technique: CT of the abdomen and pelvis was performed with axial images as well as sagittal and coronal reconstruction images with intravenous contrast but without oral contrast.    Comparison: Comparison is with study dated2023-01-17 14:53:50.    Clinical History: 34-year-old female who is presenting with low back pain and lower abdominal pain. His symptoms started earlier today gradual in onset but now rated as a 10/10. She denies any fever vomiting diarrhea or constipation. She denies any vaginal bleeding vaginal  discharge or new sexual partners. She does have a history of back pain but states this is different than she has ever had it before and she has never had it radiates to her abdomen. She denies abdominal pain is severe 10/10 cramping in nature. Back p.    Dosage Information: Automated Exposure Control was utilized 943.92 mGy.cm.    Findings:  Lines and Tubes: None.  Thorax:  Lungs: The visualized lung bases appear unremarkable. No focal infiltrate or consolidation is seen.  Pleura: No effusions or thickening.  Heart: The heart size is within normal limits.  Abdomen:  Abdominal Wall: No abdominal wall pathology is seen.  Liver: The liver appears unremarkable.  Biliary System: No intrahepatic or extrahepatic biliary duct dilatation is seen.  Gallbladder: The gallbladder appears unremarkable.  Pancreas: The pancreas appears unremarkable.  Spleen: The spleen appears unremarkable.  Adrenals: The adrenal glands appear unremarkable.  Kidneys: The kidneys appear unremarkable with no stones cysts masses or hydronephrosis.  Aorta: The abdominal aorta appears unremarkable.  IVC: Unremarkable.  Bowel:  Esophagus: The visualized esophagus appears unremarkable.  Stomach: The stomach appears unremarkable.  Duodenum: Unremarkable appearing duodenum.  Small Bowel: The small bowel appears unremarkable.  Colon: Nondistended.  Appendix: The appendix appears unremarkable and is seen on Image 58, Series 2 through Image 55, Series 2.  Peritoneum: No intraperitoneal free air or ascites is seen.    Pelvis:  Bladder: The bladder is nondistended but appears otherwise unremarkable.  Female:  Uterus: The uterus appears unremarkable.  Ovaries: The ovaries appear unremarkable with probable bilateral physiologic cysts.    Bony structures:  Dorsal Spine: There is subtle spondylosis of the visualized dorsal spine.  Bony Pelvis: The visualized bony structures of the pelvis appear unremarkable.      Impression:  1. No acute intraabdominal or pelvic  solid organ or bowel pathology identified. Details and other findings as discussed above.                                         Medications   HYDROmorphone (PF) injection 1 mg (1 mg Intravenous Given 9/21/24 2252)   morphine injection 4 mg (4 mg Intravenous Given 9/22/24 0020)   iohexoL (OMNIPAQUE 350) injection 100 mL (100 mLs Intravenous Given 9/22/24 0012)   ondansetron injection 4 mg (4 mg Intravenous Given 9/22/24 0104)     Medical Decision Making    Differentials considered but not limited to GERD, intestinal spasm, gastroenteritis, gastritis, ulcer, cholecystitis, cholelithiasis, gallstones, pancreatitis, ileus, small bowel obstruction, appendicitis, UTI, ovarian cyst, PID, diverticulitis, colitis, constipation, intestinal gas pain.    Problems Addressed:  Acute midline low back pain without sciatica: acute illness or injury  Lower abdominal pain: acute illness or injury    Amount and/or Complexity of Data Reviewed  Labs: ordered. Decision-making details documented in ED Course.  Radiology: ordered. Decision-making details documented in ED Course.    Risk  Prescription drug management.               ED Course as of 09/22/24 0148   Sun Sep 22, 2024   0053 Some small physiological ovarian cysts were identified on the CT but otherwise no acute findings. Lab work unremarkable for emergent condition. Patient's vital signs are stable.  She did have 1 episode of vomiting after getting pain medicine in the ED and was given Zofran for it.  I discussed patient's results with her.  At this time no emergent can decision found for her complaints.  We will treat symptomatically at home.  Discussed with patient signs and symptoms to return for patient comfortable with plan of care [GM]      ED Course User Index  [GM] Montserrat Osman MD                           Clinical Impression:  Final diagnoses:  [M54.50] Acute midline low back pain without sciatica (Primary)  [R10.30] Lower abdominal pain          ED Disposition  Condition    Discharge Stable          ED Prescriptions       Medication Sig Dispense Start Date End Date Auth. Provider    HYDROcodone-acetaminophen (NORCO) 5-325 mg per tablet Take 1 tablet by mouth every 6 (six) hours as needed for Pain. 12 tablet 9/22/2024 -- Montserrat Osman MD    ondansetron (ZOFRAN-ODT) 4 MG TbDL Take 1 tablet (4 mg total) by mouth every 8 (eight) hours as needed (Nausea/Vomiting). 12 tablet 9/22/2024 -- Montserrat Osman MD    dicyclomine (BENTYL) 10 MG capsule Take 1 capsule (10 mg total) by mouth 3 (three) times daily. for 3 days 9 capsule 9/22/2024 9/25/2024 Montserrat Osman MD          Follow-up Information       Follow up With Specialties Details Why Contact Info    Please follow up with a primary care physician.  Call in 3 days If symptoms worsen, return to the ED If you do not have a doctor, please call [phone] to schedule your follow up with a local primary care doctor.             Montserrat Osman MD  09/22/24 0158

## 2024-10-30 ENCOUNTER — OFFICE VISIT (OUTPATIENT)
Dept: URGENT CARE | Facility: CLINIC | Age: 34
End: 2024-10-30
Payer: MEDICAID

## 2024-10-30 VITALS
HEART RATE: 75 BPM | BODY MASS INDEX: 48.32 KG/M2 | DIASTOLIC BLOOD PRESSURE: 89 MMHG | SYSTOLIC BLOOD PRESSURE: 139 MMHG | OXYGEN SATURATION: 98 % | TEMPERATURE: 98 F | RESPIRATION RATE: 18 BRPM | WEIGHT: 283 LBS | HEIGHT: 64 IN

## 2024-10-30 DIAGNOSIS — G89.29 ACUTE EXACERBATION OF CHRONIC LOW BACK PAIN: Primary | ICD-10-CM

## 2024-10-30 DIAGNOSIS — M54.50 ACUTE EXACERBATION OF CHRONIC LOW BACK PAIN: Primary | ICD-10-CM

## 2024-10-30 LAB
B-HCG UR QL: NEGATIVE
CTP QC/QA: YES

## 2024-10-30 PROCEDURE — 99213 OFFICE O/P EST LOW 20 MIN: CPT | Mod: PBBFAC | Performed by: NURSE PRACTITIONER

## 2024-10-30 PROCEDURE — 81025 URINE PREGNANCY TEST: CPT | Mod: PBBFAC | Performed by: NURSE PRACTITIONER

## 2024-10-30 PROCEDURE — 63600175 PHARM REV CODE 636 W HCPCS

## 2024-10-30 PROCEDURE — 99203 OFFICE O/P NEW LOW 30 MIN: CPT | Mod: S$PBB,,, | Performed by: NURSE PRACTITIONER

## 2024-10-30 RX ORDER — IBUPROFEN 800 MG/1
800 TABLET ORAL EVERY 8 HOURS PRN
COMMUNITY
Start: 2024-06-04

## 2024-10-30 RX ORDER — KETOROLAC TROMETHAMINE 30 MG/ML
60 INJECTION, SOLUTION INTRAMUSCULAR; INTRAVENOUS
Status: COMPLETED | OUTPATIENT
Start: 2024-10-30 | End: 2024-10-30

## 2024-10-30 RX ORDER — CYCLOBENZAPRINE HCL 10 MG
10 TABLET ORAL 3 TIMES DAILY PRN
Qty: 21 TABLET | Refills: 0 | Status: SHIPPED | OUTPATIENT
Start: 2024-10-30 | End: 2024-11-06

## 2024-10-30 RX ADMIN — KETOROLAC TROMETHAMINE 60 MG: 30 INJECTION, SOLUTION INTRAMUSCULAR at 08:10

## 2024-10-31 ENCOUNTER — HOSPITAL ENCOUNTER (OUTPATIENT)
Dept: RADIOLOGY | Facility: HOSPITAL | Age: 34
Discharge: HOME | End: 2024-10-31
Attending: NURSE PRACTITIONER
Payer: MEDICAID

## 2024-10-31 ENCOUNTER — OFFICE VISIT (OUTPATIENT)
Dept: FAMILY MEDICINE | Facility: CLINIC | Age: 34
End: 2024-10-31
Payer: MEDICAID

## 2024-10-31 VITALS
HEIGHT: 64 IN | TEMPERATURE: 98 F | HEART RATE: 84 BPM | WEIGHT: 293 LBS | OXYGEN SATURATION: 99 % | SYSTOLIC BLOOD PRESSURE: 130 MMHG | RESPIRATION RATE: 18 BRPM | DIASTOLIC BLOOD PRESSURE: 88 MMHG | BODY MASS INDEX: 50.02 KG/M2

## 2024-10-31 DIAGNOSIS — E87.6 HYPOKALEMIA: ICD-10-CM

## 2024-10-31 DIAGNOSIS — M54.50 CHRONIC MIDLINE LOW BACK PAIN WITHOUT SCIATICA: ICD-10-CM

## 2024-10-31 DIAGNOSIS — E66.813 CLASS 3 SEVERE OBESITY DUE TO EXCESS CALORIES WITHOUT SERIOUS COMORBIDITY WITH BODY MASS INDEX (BMI) OF 50.0 TO 59.9 IN ADULT: ICD-10-CM

## 2024-10-31 DIAGNOSIS — Z23 ENCOUNTER FOR IMMUNIZATION: ICD-10-CM

## 2024-10-31 DIAGNOSIS — N76.0 BACTERIAL VAGINOSIS: ICD-10-CM

## 2024-10-31 DIAGNOSIS — Z00.00 ANNUAL PHYSICAL EXAM: Primary | ICD-10-CM

## 2024-10-31 DIAGNOSIS — G89.29 CHRONIC MIDLINE LOW BACK PAIN WITHOUT SCIATICA: ICD-10-CM

## 2024-10-31 DIAGNOSIS — E66.01 CLASS 3 SEVERE OBESITY DUE TO EXCESS CALORIES WITHOUT SERIOUS COMORBIDITY WITH BODY MASS INDEX (BMI) OF 50.0 TO 59.9 IN ADULT: ICD-10-CM

## 2024-10-31 DIAGNOSIS — K21.9 GASTROESOPHAGEAL REFLUX DISEASE, UNSPECIFIED WHETHER ESOPHAGITIS PRESENT: ICD-10-CM

## 2024-10-31 DIAGNOSIS — F17.210 CIGARETTE NICOTINE DEPENDENCE, UNCOMPLICATED: ICD-10-CM

## 2024-10-31 DIAGNOSIS — B96.89 BACTERIAL VAGINOSIS: ICD-10-CM

## 2024-10-31 DIAGNOSIS — N89.8 VAGINAL DISCHARGE: ICD-10-CM

## 2024-10-31 DIAGNOSIS — Z12.4 ENCOUNTER FOR PAPANICOLAOU SMEAR OF CERVIX: ICD-10-CM

## 2024-10-31 DIAGNOSIS — Z23 NEEDS FLU SHOT: ICD-10-CM

## 2024-10-31 LAB
ALBUMIN SERPL-MCNC: 3.7 G/DL (ref 3.5–5)
ALBUMIN/GLOB SERPL: 1.2 RATIO (ref 1.1–2)
ALP SERPL-CCNC: 68 UNIT/L (ref 40–150)
ALT SERPL-CCNC: 10 UNIT/L (ref 0–55)
ANION GAP SERPL CALC-SCNC: 8 MEQ/L
AST SERPL-CCNC: 16 UNIT/L (ref 5–34)
B-HCG UR QL: NEGATIVE
BACTERIA #/AREA URNS AUTO: ABNORMAL /HPF
BASOPHILS # BLD AUTO: 0.08 X10(3)/MCL
BASOPHILS NFR BLD AUTO: 0.9 %
BILIRUB SERPL-MCNC: 0.2 MG/DL
BILIRUB UR QL STRIP.AUTO: NEGATIVE
BUN SERPL-MCNC: 9 MG/DL (ref 7–18.7)
C TRACH DNA SPEC QL NAA+PROBE: NOT DETECTED
CALCIUM SERPL-MCNC: 8.8 MG/DL (ref 8.4–10.2)
CHLORIDE SERPL-SCNC: 108 MMOL/L (ref 98–107)
CHOLEST SERPL-MCNC: 177 MG/DL
CHOLEST/HDLC SERPL: 7 {RATIO} (ref 0–5)
CLARITY UR: ABNORMAL
CLUE CELLS VAG QL WET PREP: ABNORMAL
CO2 SERPL-SCNC: 25 MMOL/L (ref 22–29)
COLOR UR AUTO: YELLOW
CREAT SERPL-MCNC: 0.76 MG/DL (ref 0.55–1.02)
CREAT/UREA NIT SERPL: 12
CTP QC/QA: YES
EOSINOPHIL # BLD AUTO: 0.49 X10(3)/MCL (ref 0–0.9)
EOSINOPHIL NFR BLD AUTO: 5.4 %
ERYTHROCYTE [DISTWIDTH] IN BLOOD BY AUTOMATED COUNT: 15.3 % (ref 11.5–17)
EST. AVERAGE GLUCOSE BLD GHB EST-MCNC: 99.7 MG/DL
GFR SERPLBLD CREATININE-BSD FMLA CKD-EPI: >60 ML/MIN/1.73/M2
GLOBULIN SER-MCNC: 3.2 GM/DL (ref 2.4–3.5)
GLUCOSE SERPL-MCNC: 78 MG/DL (ref 74–100)
GLUCOSE UR QL STRIP: NORMAL
HBA1C MFR BLD: 5.1 %
HCT VFR BLD AUTO: 38.7 % (ref 37–47)
HCV AB SERPL QL IA: NONREACTIVE
HDLC SERPL-MCNC: 27 MG/DL (ref 35–60)
HGB BLD-MCNC: 12 G/DL (ref 12–16)
HGB UR QL STRIP: NEGATIVE
HIV 1+2 AB+HIV1 P24 AG SERPL QL IA: NONREACTIVE
HYALINE CASTS #/AREA URNS LPF: ABNORMAL /LPF
IMM GRANULOCYTES # BLD AUTO: 0.03 X10(3)/MCL (ref 0–0.04)
IMM GRANULOCYTES NFR BLD AUTO: 0.3 %
KETONES UR QL STRIP: ABNORMAL
LDLC SERPL CALC-MCNC: 124 MG/DL (ref 50–140)
LEUKOCYTE ESTERASE UR QL STRIP: 75
LYMPHOCYTES # BLD AUTO: 3.09 X10(3)/MCL (ref 0.6–4.6)
LYMPHOCYTES NFR BLD AUTO: 34.3 %
MCH RBC QN AUTO: 23.1 PG (ref 27–31)
MCHC RBC AUTO-ENTMCNC: 31 G/DL (ref 33–36)
MCV RBC AUTO: 74.4 FL (ref 80–94)
MONOCYTES # BLD AUTO: 0.68 X10(3)/MCL (ref 0.1–1.3)
MONOCYTES NFR BLD AUTO: 7.5 %
MUCOUS THREADS URNS QL MICRO: ABNORMAL /LPF
N GONORRHOEA DNA SPEC QL NAA+PROBE: NOT DETECTED
NEUTROPHILS # BLD AUTO: 4.65 X10(3)/MCL (ref 2.1–9.2)
NEUTROPHILS NFR BLD AUTO: 51.6 %
NITRITE UR QL STRIP: NEGATIVE
NRBC BLD AUTO-RTO: 0 %
PH UR STRIP: 5.5 [PH]
PLATELET # BLD AUTO: 330 X10(3)/MCL (ref 130–400)
PMV BLD AUTO: 11 FL (ref 7.4–10.4)
POTASSIUM SERPL-SCNC: 3.4 MMOL/L (ref 3.5–5.1)
PROT SERPL-MCNC: 6.9 GM/DL (ref 6.4–8.3)
PROT UR QL STRIP: ABNORMAL
RBC # BLD AUTO: 5.2 X10(6)/MCL (ref 4.2–5.4)
RBC #/AREA URNS AUTO: ABNORMAL /HPF
SODIUM SERPL-SCNC: 141 MMOL/L (ref 136–145)
SOURCE (OHS): NORMAL
SP GR UR STRIP.AUTO: 1.04 (ref 1–1.03)
SQUAMOUS #/AREA URNS LPF: ABNORMAL /HPF
T PALLIDUM AB SER QL: NONREACTIVE
T VAGINALIS VAG QL WET PREP: ABNORMAL
TRIGL SERPL-MCNC: 129 MG/DL (ref 37–140)
TSH SERPL-ACNC: 1.29 UIU/ML (ref 0.35–4.94)
UNIDENT CRYS #/AREA URNS HPF: ABNORMAL /HPF
UROBILINOGEN UR STRIP-ACNC: NORMAL
VLDLC SERPL CALC-MCNC: 26 MG/DL
WBC # BLD AUTO: 9.02 X10(3)/MCL (ref 4.5–11.5)
WBC #/AREA URNS AUTO: ABNORMAL /HPF
WBC #/AREA VAG WET PREP: ABNORMAL
YEAST SPEC QL WET PREP: ABNORMAL

## 2024-10-31 PROCEDURE — 83036 HEMOGLOBIN GLYCOSYLATED A1C: CPT | Performed by: NURSE PRACTITIONER

## 2024-10-31 PROCEDURE — 86780 TREPONEMA PALLIDUM: CPT | Performed by: NURSE PRACTITIONER

## 2024-10-31 PROCEDURE — 99215 OFFICE O/P EST HI 40 MIN: CPT | Mod: PBBFAC,25 | Performed by: NURSE PRACTITIONER

## 2024-10-31 PROCEDURE — 90677 PCV20 VACCINE IM: CPT | Mod: PBBFAC

## 2024-10-31 PROCEDURE — 80061 LIPID PANEL: CPT | Performed by: NURSE PRACTITIONER

## 2024-10-31 PROCEDURE — 81001 URINALYSIS AUTO W/SCOPE: CPT | Performed by: NURSE PRACTITIONER

## 2024-10-31 PROCEDURE — 81025 URINE PREGNANCY TEST: CPT | Mod: PBBFAC | Performed by: NURSE PRACTITIONER

## 2024-10-31 PROCEDURE — 87591 N.GONORRHOEAE DNA AMP PROB: CPT | Performed by: NURSE PRACTITIONER

## 2024-10-31 PROCEDURE — 36415 COLL VENOUS BLD VENIPUNCTURE: CPT | Performed by: NURSE PRACTITIONER

## 2024-10-31 PROCEDURE — 90471 IMMUNIZATION ADMIN: CPT | Mod: PBBFAC

## 2024-10-31 PROCEDURE — 90715 TDAP VACCINE 7 YRS/> IM: CPT | Mod: PBBFAC

## 2024-10-31 PROCEDURE — 90656 IIV3 VACC NO PRSV 0.5 ML IM: CPT | Mod: PBBFAC

## 2024-10-31 PROCEDURE — 87389 HIV-1 AG W/HIV-1&-2 AB AG IA: CPT | Performed by: NURSE PRACTITIONER

## 2024-10-31 PROCEDURE — 72100 X-RAY EXAM L-S SPINE 2/3 VWS: CPT | Mod: TC

## 2024-10-31 PROCEDURE — 87210 SMEAR WET MOUNT SALINE/INK: CPT | Performed by: NURSE PRACTITIONER

## 2024-10-31 PROCEDURE — 87491 CHLMYD TRACH DNA AMP PROBE: CPT | Performed by: NURSE PRACTITIONER

## 2024-10-31 PROCEDURE — 86803 HEPATITIS C AB TEST: CPT | Performed by: NURSE PRACTITIONER

## 2024-10-31 PROCEDURE — 84443 ASSAY THYROID STIM HORMONE: CPT | Performed by: NURSE PRACTITIONER

## 2024-10-31 PROCEDURE — 85025 COMPLETE CBC W/AUTO DIFF WBC: CPT | Performed by: NURSE PRACTITIONER

## 2024-10-31 PROCEDURE — 80053 COMPREHEN METABOLIC PANEL: CPT | Performed by: NURSE PRACTITIONER

## 2024-10-31 PROCEDURE — 90472 IMMUNIZATION ADMIN EACH ADD: CPT | Mod: PBBFAC

## 2024-10-31 RX ORDER — OMEPRAZOLE 40 MG/1
40 CAPSULE, DELAYED RELEASE ORAL EVERY MORNING
Qty: 14 CAPSULE | Refills: 0 | Status: SHIPPED | OUTPATIENT
Start: 2024-10-31 | End: 2024-11-14

## 2024-10-31 RX ORDER — METHYLPREDNISOLONE 4 MG/1
TABLET ORAL
Qty: 21 EACH | Refills: 0 | Status: SHIPPED | OUTPATIENT
Start: 2024-10-31 | End: 2024-11-21

## 2024-10-31 RX ADMIN — TETANUS TOXOID, REDUCED DIPHTHERIA TOXOID AND ACELLULAR PERTUSSIS VACCINE, ADSORBED 0.5 ML: 5; 2.5; 8; 8; 2.5 SUSPENSION INTRAMUSCULAR at 02:10

## 2024-10-31 RX ADMIN — INFLUENZA VIRUS VACCINE 0.5 ML: 15; 15; 15 SUSPENSION INTRAMUSCULAR at 02:10

## 2024-10-31 RX ADMIN — PNEUMOCOCCAL 20-VALENT CONJUGATE VACCINE 0.5 ML
2.2; 2.2; 2.2; 2.2; 2.2; 2.2; 2.2; 2.2; 2.2; 2.2; 2.2; 2.2; 2.2; 2.2; 2.2; 2.2; 4.4; 2.2; 2.2; 2.2 INJECTION, SUSPENSION INTRAMUSCULAR at 02:10

## 2024-11-01 ENCOUNTER — TELEPHONE (OUTPATIENT)
Dept: FAMILY MEDICINE | Facility: CLINIC | Age: 34
End: 2024-11-01
Payer: MEDICAID

## 2024-11-01 PROBLEM — N76.0 BACTERIAL VAGINOSIS: Status: ACTIVE | Noted: 2024-11-01

## 2024-11-01 PROBLEM — B96.89 BACTERIAL VAGINOSIS: Status: ACTIVE | Noted: 2024-11-01

## 2024-11-01 PROBLEM — E87.6 HYPOKALEMIA: Status: ACTIVE | Noted: 2024-11-01

## 2024-11-01 RX ORDER — POTASSIUM CHLORIDE 750 MG/1
10 TABLET, EXTENDED RELEASE ORAL DAILY
Qty: 3 TABLET | Refills: 0 | Status: SHIPPED | OUTPATIENT
Start: 2024-11-01 | End: 2024-11-04

## 2024-11-01 RX ORDER — FLUCONAZOLE 200 MG/1
200 TABLET ORAL DAILY
Qty: 1 TABLET | Refills: 0 | Status: SHIPPED | OUTPATIENT
Start: 2024-11-09 | End: 2024-11-10

## 2024-11-01 RX ORDER — METRONIDAZOLE 500 MG/1
500 TABLET ORAL EVERY 12 HOURS
Qty: 14 TABLET | Refills: 0 | Status: SHIPPED | OUTPATIENT
Start: 2024-11-01 | End: 2024-11-08

## 2024-11-08 ENCOUNTER — OFFICE VISIT (OUTPATIENT)
Dept: GYNECOLOGY | Facility: CLINIC | Age: 34
End: 2024-11-08
Payer: MEDICAID

## 2024-11-08 VITALS
TEMPERATURE: 98 F | WEIGHT: 293 LBS | DIASTOLIC BLOOD PRESSURE: 84 MMHG | OXYGEN SATURATION: 100 % | HEIGHT: 64 IN | BODY MASS INDEX: 50.02 KG/M2 | RESPIRATION RATE: 18 BRPM | SYSTOLIC BLOOD PRESSURE: 124 MMHG | HEART RATE: 76 BPM

## 2024-11-08 DIAGNOSIS — Z12.4 ENCOUNTER FOR PAPANICOLAOU SMEAR FOR CERVICAL CANCER SCREENING: Primary | ICD-10-CM

## 2024-11-08 DIAGNOSIS — Z12.11 COLON CANCER SCREENING: ICD-10-CM

## 2024-11-08 DIAGNOSIS — N64.4 BREAST PAIN: ICD-10-CM

## 2024-11-08 PROCEDURE — 99215 OFFICE O/P EST HI 40 MIN: CPT | Mod: PBBFAC

## 2024-11-08 NOTE — PROGRESS NOTES
[facility] -  Gynecology / Women's Health Clinic     Subjective:      Patient ID: Taylor Crain is a 34 y.o. female.    Chief Complaint:  Well Woman      History of Present Illness:  The patient  here for annual exam. Her LMP was 10/20/24. Period last 5 days and changes pads 2-3x/day on heaviest 3 days. Pt admits would like medical management, feels as if cycles are heavy. Admits cycles are monthly. Denies lightheadedness/weakness/dizziness during cycles. Admits no change in cycle. Denies history of abnormal paps. MG - 2021 & BIRADS 2; mentioned mastitis to leonor breast with superficial collection/phlegmo, recommended surgical management but lost to f/u. hx of left breast incision and drainage in 2020. Denies urinary complaints. Denies pelvic pain or vaginal discharge. Pt reports no STIs in the past and no concerns. Declines current use of contraception, requesting Depo Provera. Admits recent unprotected sexual intercourse 3 days ago. Admits tobacco use. Dep. screening 0. Denies fly hx of ovarian or uterine cancer. Father with colon cancer. Paternal aunt x2 with breast cancer.      GYN & OB History:  Patient's last menstrual period was 10/20/2024.     OB History    Para Term  AB Living   7       4 3   SAB IAB Ectopic Multiple Live Births                  # Outcome Date GA Lbr El/2nd Weight Sex Type Anes PTL Lv   7             6             5             4 AB            3 AB            2 AB            1 AB                Past Medical History:   Diagnosis Date    Morbid obesity         Past Surgical History:   Procedure Laterality Date    BREAST SURGERY       SECTION      DILATION AND CURETTAGE OF UTERUS      DILATION AND CURETTAGE OF UTERUS      US ASPIRATION ABSCESS HEMATOMA SEROMA (BREAST IMAGING)          Social History     Tobacco Use    Smoking status: Every Day     Current packs/day: 1.00     Average packs/day: 1 pack/day for 17.0 years  "(17.0 ttl pk-yrs)     Types: Cigarettes     Start date: 10/31/2007    Smokeless tobacco: Never   Substance and Sexual Activity    Alcohol use: Not Currently     Comment: social    Drug use: Yes     Types: Marijuana     Comment: social    Sexual activity: Not Currently     Partners: Female     Birth control/protection: None        Current Outpatient Medications   Medication Instructions    [START ON 11/9/2024] fluconazole (DIFLUCAN) 200 mg, Oral, Daily, Take after completion of Flagyl.    ibuprofen (ADVIL,MOTRIN) 800 mg, Every 8 hours PRN    metroNIDAZOLE (FLAGYL) 500 mg, Oral, Every 12 hours    omeprazole (PRILOSEC) 40 mg, Oral, Every morning       Review of patient's allergies indicates:  No Known Allergies      Review of Systems:  Review of Systems  Negative except for pertinent findings for positives per HPI.     Objective:     Physical Exam   Visit Vitals  /84   Pulse 76   Temp 98.3 °F (36.8 °C) (Oral)   Resp 18   Ht 5' 4" (1.626 m)   Wt 134.3 kg (296 lb)   LMP 10/20/2024   SpO2 100%   BMI 50.81 kg/m²       GENERAL: Well-developed female. No acute distress.    SKIN: Normal to inspection, warm and intact.  BREASTS: No rashes or erythema. No discharge. Tenderness/pain noted to leonor nipple area.  VULVA: General appearance normal; external genitalia with no lesions or erythema.  VAGINA: Mucosa/vaginal vault pink, no abnormal discharge or lesions.  CERVIX: Pink, parous appearing os, white patch noted at 9 o'clock, no erythema or abnormal discharge.  BIMANUAL EXAM: limited d/t body habitus. The uterus is non tender. Bilateral adnexa reveal no tenderness.  PSYCHIATRIC: Patient is oriented to person, place, and time. Mood and affect are normal.    Assessment:       ICD-10-CM ICD-9-CM   1. Encounter for Papanicolaou smear for cervical cancer screening  Z12.4 V76.2   2. Colon cancer screening  Z12.11 V76.51   3. Breast pain  N64.4 611.71       Plan:     1. Encounter for Papanicolaou smear for cervical cancer " screening  -     Ambulatory referral/consult to Gynecology  -     Liquid-Based Pap Smear, Screening    2. Colon cancer screening  -     Ambulatory referral/consult to Endo Procedure ; Future    3. Breast pain  -     Mammo Digital Diagnostic Bilat with Jax; Future; Expected date: 11/08/2024  -     US Breast Bilateral Limited; Future; Expected date: 11/08/2024    Pap today    MG diagnostic leonor - nipple tenderness/pain noted occ, chronic    Colonoscopy referral placed for screening, fly hx of father at age 43yrs    Discussed options for medical management of AUB with pt. Depo, Provera and Mirena IUD. Depo-pt informed that will likely control bleeding either with amenorrhea or lighten cycles, potential weight gain. Mirena IUD also likely best option for medical management. Limited systemic absorption, possible amenorrhea or lighter cycles and coverage for 5 years. Provera-pt informed that will likely control bleeding either with amenorrhea or lighten cycles. Discussed with pt that Provera does not provide contraception, continue condom use to prevent pregnancy, Provera is a hormone, still risk of blood clots, MI and CVA, pt states she understands risk and wishes to proceed.  Pt c/o heavy cycles, offered medications, patient decided Depo Provera, informed to call clinic when cycle starts, will get UPT if neg will order Depo Provera Injection in clinic, pt stated understanding.  H/H- 12/38.7 on 10/31/24    Call with any GYN concerns  Follow up in about 6 months (around 5/8/2025) for Annual.

## 2024-11-22 ENCOUNTER — PATIENT MESSAGE (OUTPATIENT)
Dept: RESEARCH | Facility: HOSPITAL | Age: 34
End: 2024-11-22
Payer: MEDICAID

## 2024-11-27 ENCOUNTER — PATIENT MESSAGE (OUTPATIENT)
Dept: RESEARCH | Facility: HOSPITAL | Age: 34
End: 2024-11-27
Payer: MEDICAID

## 2025-02-03 ENCOUNTER — HOSPITAL ENCOUNTER (EMERGENCY)
Facility: HOSPITAL | Age: 35
Discharge: HOME | End: 2025-02-03
Attending: EMERGENCY MEDICINE
Payer: MEDICAID

## 2025-02-03 VITALS
DIASTOLIC BLOOD PRESSURE: 88 MMHG | SYSTOLIC BLOOD PRESSURE: 143 MMHG | TEMPERATURE: 99 F | HEIGHT: 64 IN | BODY MASS INDEX: 46.61 KG/M2 | WEIGHT: 273 LBS | RESPIRATION RATE: 16 BRPM | HEART RATE: 92 BPM | OXYGEN SATURATION: 99 %

## 2025-02-03 DIAGNOSIS — M25.561 RIGHT KNEE PAIN: ICD-10-CM

## 2025-02-03 DIAGNOSIS — M17.11 ARTHRITIS OF RIGHT KNEE: Primary | ICD-10-CM

## 2025-02-03 PROCEDURE — 25000003 PHARM REV CODE 250: Performed by: PHYSICIAN ASSISTANT

## 2025-02-03 PROCEDURE — 63600175 PHARM REV CODE 636 W HCPCS: Mod: TB,JZ | Performed by: PHYSICIAN ASSISTANT

## 2025-02-03 PROCEDURE — 96372 THER/PROPH/DIAG INJ SC/IM: CPT | Mod: JZ | Performed by: PHYSICIAN ASSISTANT

## 2025-02-03 PROCEDURE — 99284 EMERGENCY DEPT VISIT MOD MDM: CPT | Mod: 25

## 2025-02-03 RX ORDER — DICLOFENAC SODIUM 10 MG/G
2 GEL TOPICAL 4 TIMES DAILY
Qty: 100 G | Refills: 0 | Status: SHIPPED | OUTPATIENT
Start: 2025-02-03

## 2025-02-03 RX ORDER — HYDROCODONE BITARTRATE AND ACETAMINOPHEN 5; 325 MG/1; MG/1
1 TABLET ORAL
Status: COMPLETED | OUTPATIENT
Start: 2025-02-03 | End: 2025-02-03

## 2025-02-03 RX ORDER — KETOROLAC TROMETHAMINE 30 MG/ML
30 INJECTION, SOLUTION INTRAMUSCULAR; INTRAVENOUS
Status: COMPLETED | OUTPATIENT
Start: 2025-02-03 | End: 2025-02-03

## 2025-02-03 RX ORDER — DICLOFENAC SODIUM 50 MG/1
50 TABLET, DELAYED RELEASE ORAL 2 TIMES DAILY
Qty: 28 TABLET | Refills: 0 | Status: SHIPPED | OUTPATIENT
Start: 2025-02-03 | End: 2025-02-17

## 2025-02-03 RX ORDER — DEXAMETHASONE SODIUM PHOSPHATE 4 MG/ML
8 INJECTION, SOLUTION INTRA-ARTICULAR; INTRALESIONAL; INTRAMUSCULAR; INTRAVENOUS; SOFT TISSUE
Status: DISCONTINUED | OUTPATIENT
Start: 2025-02-03 | End: 2025-02-03

## 2025-02-03 RX ADMIN — KETOROLAC TROMETHAMINE 30 MG: 30 INJECTION, SOLUTION INTRAMUSCULAR at 08:02

## 2025-02-03 RX ADMIN — HYDROCODONE BITARTRATE AND ACETAMINOPHEN 1 TABLET: 5; 325 TABLET ORAL at 08:02

## 2025-02-03 NOTE — Clinical Note
"Taylor"Cherry Crain was seen and treated in our emergency department on 2/3/2025.  She may return to work on 02/05/2025.  Please excuse for up to 48hours prior for symptoms present at that time if possible. Patient may also return sooner than above date if symptoms improve/resolve.       If you have any questions or concerns, please don't hesitate to call.      Shyann Goldberg PA"
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
## 2025-02-09 ENCOUNTER — HOSPITAL ENCOUNTER (EMERGENCY)
Facility: HOSPITAL | Age: 35
Discharge: HOME | End: 2025-02-09
Attending: EMERGENCY MEDICINE
Payer: MEDICAID

## 2025-02-09 VITALS
DIASTOLIC BLOOD PRESSURE: 90 MMHG | WEIGHT: 280 LBS | BODY MASS INDEX: 47.8 KG/M2 | OXYGEN SATURATION: 97 % | RESPIRATION RATE: 18 BRPM | HEART RATE: 105 BPM | SYSTOLIC BLOOD PRESSURE: 152 MMHG | TEMPERATURE: 99 F | HEIGHT: 64 IN

## 2025-02-09 DIAGNOSIS — B34.9 VIRAL SYNDROME: Primary | ICD-10-CM

## 2025-02-09 DIAGNOSIS — R05.9 COUGH: ICD-10-CM

## 2025-02-09 LAB
FLUAV AG UPPER RESP QL IA.RAPID: NOT DETECTED
FLUBV AG UPPER RESP QL IA.RAPID: NOT DETECTED
RSV A 5' UTR RNA NPH QL NAA+PROBE: NOT DETECTED
SARS-COV-2 RNA RESP QL NAA+PROBE: NOT DETECTED

## 2025-02-09 PROCEDURE — 96372 THER/PROPH/DIAG INJ SC/IM: CPT

## 2025-02-09 PROCEDURE — 25000242 PHARM REV CODE 250 ALT 637 W/ HCPCS

## 2025-02-09 PROCEDURE — 99284 EMERGENCY DEPT VISIT MOD MDM: CPT | Mod: 25

## 2025-02-09 PROCEDURE — 0241U COVID/RSV/FLU A&B PCR: CPT

## 2025-02-09 PROCEDURE — 99900031 HC PATIENT EDUCATION (STAT)

## 2025-02-09 PROCEDURE — 63600175 PHARM REV CODE 636 W HCPCS

## 2025-02-09 PROCEDURE — 94640 AIRWAY INHALATION TREATMENT: CPT

## 2025-02-09 RX ORDER — ALBUTEROL SULFATE 90 UG/1
2 INHALANT RESPIRATORY (INHALATION) EVERY 6 HOURS PRN
Qty: 18 G | Refills: 0 | Status: SHIPPED | OUTPATIENT
Start: 2025-02-09 | End: 2026-02-09

## 2025-02-09 RX ORDER — IPRATROPIUM BROMIDE AND ALBUTEROL SULFATE 2.5; .5 MG/3ML; MG/3ML
3 SOLUTION RESPIRATORY (INHALATION)
Status: COMPLETED | OUTPATIENT
Start: 2025-02-09 | End: 2025-02-09

## 2025-02-09 RX ORDER — DEXAMETHASONE SODIUM PHOSPHATE 4 MG/ML
8 INJECTION, SOLUTION INTRA-ARTICULAR; INTRALESIONAL; INTRAMUSCULAR; INTRAVENOUS; SOFT TISSUE
Status: COMPLETED | OUTPATIENT
Start: 2025-02-09 | End: 2025-02-09

## 2025-02-09 RX ORDER — BENZONATATE 100 MG/1
100 CAPSULE ORAL 3 TIMES DAILY PRN
Qty: 15 CAPSULE | Refills: 0 | Status: SHIPPED | OUTPATIENT
Start: 2025-02-09

## 2025-02-09 RX ORDER — FLUTICASONE PROPIONATE 50 MCG
1 SPRAY, SUSPENSION (ML) NASAL 2 TIMES DAILY PRN
Qty: 15 G | Refills: 0 | Status: SHIPPED | OUTPATIENT
Start: 2025-02-09 | End: 2025-02-10

## 2025-02-09 RX ORDER — LORATADINE 10 MG/1
10 TABLET ORAL DAILY PRN
Qty: 30 TABLET | Refills: 0 | Status: SHIPPED | OUTPATIENT
Start: 2025-02-09

## 2025-02-09 RX ADMIN — DEXAMETHASONE SODIUM PHOSPHATE 8 MG: 4 INJECTION, SOLUTION INTRA-ARTICULAR; INTRALESIONAL; INTRAMUSCULAR; INTRAVENOUS; SOFT TISSUE at 06:02

## 2025-02-09 RX ADMIN — IPRATROPIUM BROMIDE AND ALBUTEROL SULFATE 3 ML: 2.5; .5 SOLUTION RESPIRATORY (INHALATION) at 06:02

## 2025-02-09 NOTE — Clinical Note
"Taylor Watson"Paras was seen and treated in our emergency department on 2/9/2025.  She may return to work on 02/12/2025.       If you have any questions or concerns, please don't hesitate to call.      Luna Toribio, NP"

## 2025-02-10 RX ORDER — FLUTICASONE PROPIONATE 50 MCG
SPRAY, SUSPENSION (ML) NASAL
Qty: 48 G | Refills: 0 | Status: SHIPPED | OUTPATIENT
Start: 2025-02-10

## 2025-02-10 NOTE — ED PROVIDER NOTES
Encounter Date: 2025       History     Chief Complaint   Patient presents with    Cough     Pt complaint of cough with wheezing, fever and runny nose over the past 2 days     34 y.o.  female presents to Emergency Department with a chief complaint of cough. Symptoms began on yesterday and have been constant since onset. Associated symptoms include fever, congestion, and wheezing. Patient reports recent sick contact. The patient denies CP, SOB, dizziness, vomiting, or abdominal pain. No other reported symptoms at this time      The history is provided by the patient. No  was used.   Cough  This is a new problem. The current episode started yesterday. The problem occurs every few minutes. The problem has been unchanged. The cough is Productive of sputum. The fever has been present for Less than 1 day. Associated symptoms include rhinorrhea. Pertinent negatives include no chest pain, no chills, no sweats, no ear pain, no sore throat, no shortness of breath and no wheezing. She has tried decongestants for the symptoms. The treatment provided no relief.     Review of patient's allergies indicates:  No Known Allergies  Past Medical History:   Diagnosis Date    Morbid obesity      Past Surgical History:   Procedure Laterality Date    BREAST SURGERY       SECTION      DILATION AND CURETTAGE OF UTERUS      DILATION AND CURETTAGE OF UTERUS      US ASPIRATION ABSCESS HEMATOMA SEROMA (BREAST IMAGING)       Family History   Problem Relation Name Age of Onset    Heart disease Mother Laura gama     Hypertension Mother Laura gama     Diabetes Mother Laura gama     Heart failure Mother Laura gama     Hypertension Father Kobe white sr.     Diabetes Father Kobe white sr.     Cancer Father Kobe white sr.     Colon cancer Father Kobe white sr. 72    Hypertension Sister Samantha Addison     Diabetes Sister Samantha Addison     Hypertension Brother Davi Addison      Diabetes Brother Davi Addison     Hypertension Brother Jessie Addison     Hypertension Brother Kobe Addison     Diabetes Brother Kobe Addison     Breast cancer Maternal Aunt Penny valladares      Social History     Tobacco Use    Smoking status: Every Day     Current packs/day: 1.00     Average packs/day: 1 pack/day for 17.3 years (17.3 ttl pk-yrs)     Types: Cigarettes     Start date: 10/31/2007    Smokeless tobacco: Never   Substance Use Topics    Alcohol use: Not Currently     Comment: social    Drug use: Yes     Types: Marijuana     Comment: social     Review of Systems   Constitutional:  Positive for fever. Negative for chills, diaphoresis and fatigue.   HENT:  Positive for congestion and rhinorrhea. Negative for ear pain, sore throat, trouble swallowing and voice change.    Respiratory:  Positive for cough. Negative for shortness of breath, wheezing and stridor.    Cardiovascular:  Negative for chest pain, palpitations and leg swelling.   Gastrointestinal:  Negative for abdominal pain, nausea and vomiting.   All other systems reviewed and are negative.      Physical Exam     Initial Vitals [02/09/25 1814]   BP Pulse Resp Temp SpO2   (!) 173/118 103 20 99.3 °F (37.4 °C) 98 %      MAP       --         Physical Exam    Nursing note and vitals reviewed.  Constitutional: She appears well-developed and well-nourished. She is not diaphoretic. She is Obese . She is cooperative.  Non-toxic appearance. No distress.   HENT:   Head: Normocephalic and atraumatic.   Right Ear: Hearing, external ear and ear canal normal. No drainage, swelling or tenderness.   Left Ear: Hearing, external ear and ear canal normal. No drainage, swelling or tenderness.   Nose: Rhinorrhea present. Mouth/Throat: Oropharynx is clear and moist and mucous membranes are normal. No oropharyngeal exudate, posterior oropharyngeal edema, posterior oropharyngeal erythema or tonsillar abscesses.   Bilateral TM obscured by cerumen.    Eyes:  Conjunctivae and EOM are normal. Pupils are equal, round, and reactive to light.   Neck: Neck supple.   Normal range of motion.  Cardiovascular:  Normal rate, regular rhythm, S1 normal, S2 normal, normal heart sounds, intact distal pulses and normal pulses.           Pulmonary/Chest: Effort normal and breath sounds normal. No tachypnea and no bradypnea. No respiratory distress. She has no decreased breath sounds. She has no wheezes. She has no rhonchi. She has no rales. She exhibits no tenderness.   In no respiratory distress. Able to speak in complete sentences.    Abdominal: Abdomen is soft. Bowel sounds are normal. She exhibits no distension. There is no abdominal tenderness. There is no rebound.   Musculoskeletal:         General: Normal range of motion.      Cervical back: Normal range of motion and neck supple.     Neurological: She is alert and oriented to person, place, and time. She has normal strength. No sensory deficit. GCS score is 15. GCS eye subscore is 4. GCS verbal subscore is 5. GCS motor subscore is 6.   Skin: Skin is warm and dry. Capillary refill takes less than 2 seconds.   Psychiatric: She has a normal mood and affect. Thought content normal.         ED Course   Procedures  Labs Reviewed   COVID/RSV/FLU A&B PCR - Normal       Result Value    Influenza A PCR Not Detected      Influenza B PCR Not Detected      Respiratory Syncytial Virus PCR Not Detected      SARS-CoV-2 PCR Not Detected      Narrative:     The Xpert Xpress SARS-CoV-2/FLU/RSV plus is a rapid, multiplexed real-time PCR test intended for the simultaneous qualitative detection and differentiation of SARS-CoV-2, Influenza A, Influenza B, and respiratory syncytial virus (RSV) viral RNA in either nasopharyngeal swab or nasal swab specimens.                Imaging Results              X-Ray Chest PA And Lateral (Final result)  Result time 02/09/25 18:47:13      Final result by Dhruv Castellanos MD (02/09/25 18:47:13)                    Impression:      No acute abnormality.      Electronically signed by: Dhruv Castellanos MD  Date:    02/09/2025  Time:    18:47               Narrative:    EXAMINATION:  XR CHEST PA AND LATERAL    CLINICAL HISTORY:  Cough, unspecified    TECHNIQUE:  PA and lateral views of the chest were performed.    COMPARISON:  05/30/2020    FINDINGS:  The lungs are clear, with normal appearance of pulmonary vasculature and no pleural effusion or pneumothorax.    The cardiac silhouette is normal in size. The hilar and mediastinal contours are unremarkable.    Bones are intact.                                       Medications   dexAMETHasone injection 8 mg (8 mg Intramuscular Given 2/9/25 1848)   albuterol-ipratropium 2.5 mg-0.5 mg/3 mL nebulizer solution 3 mL (3 mLs Nebulization Given 2/9/25 1859)     Medical Decision Making  Patient awake, alert, has non-labored breathing, and follows commands appropriately. Arrived to ED due to cough, congestion, and fever. +sick contacts. Afebrile. NAD Noted.    Judging by the patient's chief complaint and pertinent history, the patient has the following possible differential diagnoses, including but not limited to the following: COVID, Flu, Viral Syndrome     Some of these are deemed to be lower likelihood and some more likely based on my physical exam and history combined with possible lab work and/or imaging studies. Please see the pertinent studies, and refer to the HPI. Some of these diagnoses will take further evaluation to fully rule out, perhaps as an outpatient and the patient was encouraged to follow up when discharged for more comprehensive evaluation.         Amount and/or Complexity of Data Reviewed  Labs: ordered. Decision-making details documented in ED Course.     Details: Swabs negative. Informed patient of results.   Radiology: ordered. Decision-making details documented in ED Course.     Details: Informed patient of results.   Discussion of management or test interpretation  with external provider(s): Discussed plan of care and interventions with patient. Agreed to and aware of plan of care. Comfortable being discharged home. Patient discharged home. Patient denies new or additional complaints; no further tests indicated at this time. Verbalized understanding of instructions. No emergent or apparent distress noted prior to discharge. Strict ER return precautions given.       Risk  OTC drugs.  Prescription drug management.               ED Course as of 02/09/25 1941   Sun Feb 09, 2025   1850 X-Ray Chest PA And Lateral  No acute abnormality.    [JA]   1922 SARS-CoV2 (COVID-19) Qualitative PCR: Not Detected [JA]   1925 RSV Ag by Molecular Method: Not Detected [JA]   1925 Influenza B, Molecular: Not Detected [JA]   1925 Influenza A, Molecular: Not Detected [JA]   1925 Patient reports improvement in symptoms after med admin. Symptoms likely viral in nature. Will treat with medication. At disposition, patient has no additional complaints, VSS, breath sounds clear, in no respiratory distress, and non-toxic in appearance. Stable for discharge home.  [JA]      ED Course User Index  [JA] Luna Toribio, NP                           Clinical Impression:  Final diagnoses:  [R05.9] Cough  [B34.9] Viral syndrome (Primary)          ED Disposition Condition    Discharge Stable          ED Prescriptions       Medication Sig Dispense Start Date End Date Auth. Provider    fluticasone propionate (FLONASE) 50 mcg/actuation nasal spray 1 spray (50 mcg total) by Each Nostril route 2 (two) times daily as needed for Rhinitis or Allergies. 15 g 2/9/2025 -- Luna Toribio, NP    benzonatate (TESSALON) 100 MG capsule Take 1 capsule (100 mg total) by mouth 3 (three) times daily as needed for Cough. 15 capsule 2/9/2025 -- Luna Toribio, NP    albuterol (VENTOLIN HFA) 90 mcg/actuation inhaler Inhale 2 puffs into the lungs every 6 (six) hours as needed for Wheezing or Shortness of Breath. Rescue 18 g  2/9/2025 2/9/2026 Luna Toribio, NP    loratadine (CLARITIN) 10 mg tablet Take 1 tablet (10 mg total) by mouth daily as needed for Allergies. 30 tablet 2/9/2025 -- Luna Toribio, NP          Follow-up Information       Follow up With Specialties Details Why Contact Info    Maria T aPinter, FNP Family Medicine Call in 1 day If symptoms worsen, As needed [address] [facility] [address]  [phone]      [facility] - Emergency Dept Emergency Medicine Go to  If symptoms worsen, As needed [address]  [facility] [phone]             Luna Toribio, DIANE  02/09/25 1942

## 2025-03-11 DIAGNOSIS — Z12.11 COLON CANCER SCREENING: Primary | ICD-10-CM

## 2025-03-11 RX ORDER — POLYETHYLENE GLYCOL 3350, SODIUM SULFATE, SODIUM CHLORIDE, POTASSIUM CHLORIDE, SODIUM ASCORBATE, AND ASCORBIC ACID 7.5-2.691G
KIT ORAL
Qty: 1 KIT | Refills: 0 | Status: SHIPPED | OUTPATIENT
Start: 2025-03-11

## 2025-07-05 ENCOUNTER — HOSPITAL ENCOUNTER (EMERGENCY)
Facility: HOSPITAL | Age: 35
Discharge: HOME | End: 2025-07-05
Attending: EMERGENCY MEDICINE
Payer: MEDICAID

## 2025-07-05 VITALS
HEART RATE: 82 BPM | HEIGHT: 64 IN | RESPIRATION RATE: 20 BRPM | BODY MASS INDEX: 48.83 KG/M2 | WEIGHT: 286 LBS | OXYGEN SATURATION: 97 % | SYSTOLIC BLOOD PRESSURE: 172 MMHG | TEMPERATURE: 98 F | DIASTOLIC BLOOD PRESSURE: 90 MMHG

## 2025-07-05 DIAGNOSIS — N30.00 ACUTE CYSTITIS WITHOUT HEMATURIA: ICD-10-CM

## 2025-07-05 DIAGNOSIS — R10.2 SUPRAPUBIC PAIN: Primary | ICD-10-CM

## 2025-07-05 LAB
ALBUMIN SERPL-MCNC: 3.6 G/DL (ref 3.5–5)
ALBUMIN/GLOB SERPL: 1 RATIO (ref 1.1–2)
ALP SERPL-CCNC: 66 UNIT/L (ref 40–150)
ALT SERPL-CCNC: 10 UNIT/L (ref 0–55)
ANION GAP SERPL CALC-SCNC: 8 MEQ/L
AST SERPL-CCNC: 12 UNIT/L (ref 11–45)
B-HCG UR QL: NEGATIVE
BACTERIA #/AREA URNS AUTO: ABNORMAL /HPF
BASOPHILS # BLD AUTO: 0.07 X10(3)/MCL
BASOPHILS NFR BLD AUTO: 0.8 %
BILIRUB SERPL-MCNC: 0.2 MG/DL
BILIRUB UR QL STRIP.AUTO: NEGATIVE
BUN SERPL-MCNC: 9.7 MG/DL (ref 7–18.7)
CALCIUM SERPL-MCNC: 8.9 MG/DL (ref 8.4–10.2)
CHLORIDE SERPL-SCNC: 110 MMOL/L (ref 98–107)
CLARITY UR: ABNORMAL
CO2 SERPL-SCNC: 24 MMOL/L (ref 22–29)
COLOR UR AUTO: YELLOW
CREAT SERPL-MCNC: 0.74 MG/DL (ref 0.55–1.02)
CREAT/UREA NIT SERPL: 13
EOSINOPHIL # BLD AUTO: 0.46 X10(3)/MCL (ref 0–0.9)
EOSINOPHIL NFR BLD AUTO: 5.1 %
ERYTHROCYTE [DISTWIDTH] IN BLOOD BY AUTOMATED COUNT: 15.7 % (ref 11.5–17)
GFR SERPLBLD CREATININE-BSD FMLA CKD-EPI: >60 ML/MIN/1.73/M2
GLOBULIN SER-MCNC: 3.5 GM/DL (ref 2.4–3.5)
GLUCOSE SERPL-MCNC: 99 MG/DL (ref 74–100)
GLUCOSE UR QL STRIP: NEGATIVE
HCT VFR BLD AUTO: 40.3 % (ref 37–47)
HGB BLD-MCNC: 12.5 G/DL (ref 12–16)
HGB UR QL STRIP: NEGATIVE
IMM GRANULOCYTES # BLD AUTO: 0.01 X10(3)/MCL (ref 0–0.04)
IMM GRANULOCYTES NFR BLD AUTO: 0.1 %
KETONES UR QL STRIP: ABNORMAL
LEUKOCYTE ESTERASE UR QL STRIP: ABNORMAL
LIPASE SERPL-CCNC: 16 U/L
LYMPHOCYTES # BLD AUTO: 3.7 X10(3)/MCL (ref 0.6–4.6)
LYMPHOCYTES NFR BLD AUTO: 41.3 %
MCH RBC QN AUTO: 22.6 PG (ref 27–31)
MCHC RBC AUTO-ENTMCNC: 31 G/DL (ref 33–36)
MCV RBC AUTO: 72.7 FL (ref 80–94)
MONOCYTES # BLD AUTO: 0.61 X10(3)/MCL (ref 0.1–1.3)
MONOCYTES NFR BLD AUTO: 6.8 %
MUCOUS THREADS URNS QL MICRO: ABNORMAL /LPF
NEUTROPHILS # BLD AUTO: 4.11 X10(3)/MCL (ref 2.1–9.2)
NEUTROPHILS NFR BLD AUTO: 45.9 %
NITRITE UR QL STRIP: NEGATIVE
NRBC BLD AUTO-RTO: 0 %
PH UR STRIP: 6 [PH]
PLATELET # BLD AUTO: 278 X10(3)/MCL (ref 130–400)
PMV BLD AUTO: 10.3 FL (ref 7.4–10.4)
POTASSIUM SERPL-SCNC: 3.4 MMOL/L (ref 3.5–5.1)
PROT SERPL-MCNC: 7.1 GM/DL (ref 6.4–8.3)
PROT UR QL STRIP: NEGATIVE
RBC # BLD AUTO: 5.54 X10(6)/MCL (ref 4.2–5.4)
RBC #/AREA URNS AUTO: ABNORMAL /HPF
SODIUM SERPL-SCNC: 142 MMOL/L (ref 136–145)
SP GR UR STRIP.AUTO: 1.02 (ref 1–1.03)
SQUAMOUS #/AREA URNS AUTO: ABNORMAL /HPF
UROBILINOGEN UR STRIP-ACNC: 0.2
WBC # BLD AUTO: 8.96 X10(3)/MCL (ref 4.5–11.5)
WBC #/AREA URNS AUTO: ABNORMAL /HPF

## 2025-07-05 PROCEDURE — 85025 COMPLETE CBC W/AUTO DIFF WBC: CPT | Performed by: EMERGENCY MEDICINE

## 2025-07-05 PROCEDURE — 81003 URINALYSIS AUTO W/O SCOPE: CPT | Performed by: EMERGENCY MEDICINE

## 2025-07-05 PROCEDURE — 99283 EMERGENCY DEPT VISIT LOW MDM: CPT

## 2025-07-05 PROCEDURE — 81025 URINE PREGNANCY TEST: CPT | Performed by: EMERGENCY MEDICINE

## 2025-07-05 PROCEDURE — 80053 COMPREHEN METABOLIC PANEL: CPT | Performed by: EMERGENCY MEDICINE

## 2025-07-05 PROCEDURE — 83690 ASSAY OF LIPASE: CPT | Performed by: EMERGENCY MEDICINE

## 2025-07-05 RX ORDER — CEPHALEXIN 500 MG/1
500 CAPSULE ORAL EVERY 12 HOURS
Qty: 10 CAPSULE | Refills: 0 | Status: SHIPPED | OUTPATIENT
Start: 2025-07-05 | End: 2025-07-10

## 2025-07-05 RX ORDER — PHENAZOPYRIDINE HYDROCHLORIDE 200 MG/1
200 TABLET, FILM COATED ORAL 3 TIMES DAILY
Qty: 6 TABLET | Refills: 0 | Status: SHIPPED | OUTPATIENT
Start: 2025-07-05

## 2025-07-05 RX ORDER — IBUPROFEN 600 MG/1
600 TABLET, FILM COATED ORAL EVERY 6 HOURS PRN
Qty: 20 TABLET | Refills: 0 | Status: SHIPPED | OUTPATIENT
Start: 2025-07-05

## 2025-07-05 NOTE — Clinical Note
"Taylor Bunnviviana Crain was seen and treated in our emergency department on 7/5/2025.  She may return to work on 07/08/2025.       If you have any questions or concerns, please don't hesitate to call.      Montserrat Osman MD"

## 2025-07-06 NOTE — DISCHARGE INSTRUCTIONS
Today we discussed getting a CT to evaluate for your lower abdominal pain.  At this time you declined understanding we could miss serious medical condition.  Please return if you do not have improvement while on antibiotics in 24 hours.  You may return sooner if your symptoms worsen or change.

## 2025-07-06 NOTE — ED PROVIDER NOTES
Encounter Date: 2025       History     Chief Complaint   Patient presents with    Back Pain     Left lower back pain and pressure over bladder area for 2 days-     Patient is a 35-year-old female presenting with lower abdominal pain and lower back pain.  Symptoms been ongoing for the last 3 days.  No falls or accidents.  She has also been having some increased urination.  She denies any dysuria or pain with urination.  States in the past she had some symptoms that were consistent with UTI but also had pain with urination for those episodes.  She denies any vaginal bleeding or vaginal discharge.  No fevers, chills, chest pain, shortness of breath, leg swelling or other complaints at this time.        Review of patient's allergies indicates:  No Known Allergies  Past Medical History:   Diagnosis Date    Morbid obesity      Past Surgical History:   Procedure Laterality Date    BREAST SURGERY      cyst drainage     SECTION      DILATION AND CURETTAGE OF UTERUS      DILATION AND CURETTAGE OF UTERUS      US ASPIRATION ABSCESS HEMATOMA SEROMA W GUIDANCE (BREAST IMAGING) (XPD)       Family History   Problem Relation Name Age of Onset    Heart disease Mother Laura gama     Hypertension Mother Laura gama     Diabetes Mother Laura gama     Heart failure Mother Laura gama     Hypertension Father Kobe white sr.     Diabetes Father Kobe white sr.     Cancer Father Kobe white sr.     Colon cancer Father Kobe white sr. 72    Hypertension Sister Samantha Addison     Diabetes Sister Samantha Addison     Hypertension Brother Christopher Addison     Diabetes Brother Christopher Addison     Hypertension Brother Dacoty Addison     Hypertension Brother Kobe Addison     Diabetes Brother Kobe Addison     Breast cancer Maternal Aunt Penny valladares      Social History[1]  Review of Systems   All other systems reviewed and are negative.      Physical Exam     Initial Vitals [25]   BP Pulse  Resp Temp SpO2   (!) 172/90 82 20 98.2 °F (36.8 °C) 97 %      MAP       --         Physical Exam    Nursing note and vitals reviewed.  Constitutional: She appears well-developed and well-nourished. No distress.   HENT:   Head: Normocephalic and atraumatic.   Neck: Neck supple.   Cardiovascular:  Normal rate, regular rhythm and normal heart sounds.           No murmur heard.  Pulmonary/Chest: Breath sounds normal. No respiratory distress. She has no wheezes. She has no rhonchi.   Abdominal: Abdomen is soft. Bowel sounds are normal. She exhibits no distension. There is abdominal tenderness (Bilateral lower quadrants.).   Mild bilateral CVA tenderness. There is guarding. There is no rebound.   Musculoskeletal:         General: No edema. Normal range of motion.      Cervical back: Neck supple.     Neurological: She is alert and oriented to person, place, and time.   Skin: Skin is warm and dry.   Psychiatric: She has a normal mood and affect. Thought content normal.         ED Course   Procedures  Labs Reviewed   URINALYSIS, REFLEX TO URINE CULTURE - Abnormal       Result Value    Color, UA Yellow      Appearance, UA Hazy (*)     Specific Gravity, UA 1.020      pH, UA 6.0      Protein, UA Negative      Glucose, UA Negative      Ketones, UA Trace (*)     Blood, UA Negative      Bilirubin, UA Negative      Urobilinogen, UA 0.2      Nitrites, UA Negative      Leukocyte Esterase, UA Trace (*)    URINALYSIS, MICROSCOPIC - Abnormal    Bacteria, UA Occasional      Mucous, UA Trace (*)     RBC, UA 0-2      WBC, UA 3-5      Squamous Epithelial Cells, UA Few (*)    COMPREHENSIVE METABOLIC PANEL - Abnormal    Sodium 142      Potassium 3.4 (*)     Chloride 110 (*)     CO2 24      Glucose 99      Blood Urea Nitrogen 9.7      Creatinine 0.74      Calcium 8.9      Protein Total 7.1      Albumin 3.6      Globulin 3.5      Albumin/Globulin Ratio 1.0 (*)     Bilirubin Total 0.2      ALP 66      ALT 10      AST 12      eGFR >60      Anion  Gap 8.0      BUN/Creatinine Ratio 13     CBC WITH DIFFERENTIAL - Abnormal    WBC 8.96      RBC 5.54 (*)     Hgb 12.5      Hct 40.3      MCV 72.7 (*)     MCH 22.6 (*)     MCHC 31.0 (*)     RDW 15.7      Platelet 278      MPV 10.3      Neut % 45.9      Lymph % 41.3      Mono % 6.8      Eos % 5.1      Basophil % 0.8      Imm Grans % 0.1      Neut # 4.11      Lymph # 3.70      Mono # 0.61      Eos # 0.46      Baso # 0.07      Imm Gran # 0.01      NRBC% 0.0     PREGNANCY TEST, URINE RAPID - Normal    hCG Qualitative, Urine Negative     LIPASE - Normal    Lipase Level 16     CBC W/ AUTO DIFFERENTIAL    Narrative:     The following orders were created for panel order CBC W/ AUTO DIFFERENTIAL.  Procedure                               Abnormality         Status                     ---------                               -----------         ------                     CBC with Differential[[id number]]       Abnormal            Final result                 Please view results for these tests on the individual orders.          Imaging Results    None          Medications - No data to display  Medical Decision Making  Patient is a 35-year-old female presenting with suprapubic abdominal pain and low back pain.  She has some mild urinary symptoms but her urine is not super convincing for urinary tract infection.  We will expand workup with laboratory work.    Patient was offered CT. After a discussion of risks and benefits, patient opts to treat as UTI and return if no improvement in symptoms in 24 hours.     Problems Addressed:  Acute cystitis without hematuria: acute illness or injury  Suprapubic pain: acute illness or injury    Amount and/or Complexity of Data Reviewed  Labs: ordered. Decision-making details documented in ED Course.    Risk  Prescription drug management.               ED Course as of 07/06/25 0641   Sat Jul 05, 2025   2321 Leukocyte Esterase, UA(!): Trace [GM]   2322 Ketones, UA(!): Trace  Will re-evaluate and  discuss possible CT with patient  Patient was offered CT abdomen and pelvis to evaluate for more emergent condition considering her suprapubic abdominal pain.  At this time she declines.  She states this has felt similar to prior UTI.  She would like to try trial antibiotics and return if her symptoms do not improve in 24 hours.  Patient understands remain miss more serious medical condition. [GM]      ED Course User Index  [GM] Montserrat Osman MD                           Clinical Impression:  Final diagnoses:  [R10.2] Suprapubic pain (Primary)  [N30.00] Acute cystitis without hematuria          ED Disposition Condition    Discharge Stable          ED Prescriptions       Medication Sig Dispense Start Date End Date Auth. Provider    cephALEXin (KEFLEX) 500 MG capsule Take 1 capsule (500 mg total) by mouth every 12 (twelve) hours. for 5 days 10 capsule 7/5/2025 7/10/2025 Montserrat Osman MD    phenazopyridine (PYRIDIUM) 200 MG tablet Take 1 tablet (200 mg total) by mouth 3 (three) times daily. 6 tablet 7/5/2025 -- Montserrat Osman MD    ibuprofen (ADVIL,MOTRIN) 600 MG tablet Take 1 tablet (600 mg total) by mouth every 6 (six) hours as needed for Pain. 20 tablet 7/5/2025 -- Montserrat Osman MD          Follow-up Information       Follow up With Specialties Details Why Contact Info    Please follow up with a primary care physician.  Call in 3 days If symptoms worsen, return to the ED If you do not have a doctor, please call [phone] to schedule your follow up with a local primary care doctor.    [facility] - Emergency Dept Emergency Medicine  if no improvement in 24 hours as discussed [id number]  [facility] [id number] [phone]                   [1]   Social History  Tobacco Use    Smoking status: Every Day     Current packs/day: 1.00     Average packs/day: 1 pack/day for 17.7 years (17.7 ttl pk-yrs)     Types: Cigarettes     Start date: 10/31/2007    Smokeless  tobacco: Never   Vaping Use    Vaping status: Never Used   Substance Use Topics    Alcohol use: Not Currently    Drug use: Yes     Types: Marijuana     Comment: Montserrat Aceves MD  07/06/25 0692